# Patient Record
Sex: FEMALE | Race: WHITE | Employment: FULL TIME | URBAN - METROPOLITAN AREA
[De-identification: names, ages, dates, MRNs, and addresses within clinical notes are randomized per-mention and may not be internally consistent; named-entity substitution may affect disease eponyms.]

---

## 2017-01-31 ENCOUNTER — OFFICE VISIT (OUTPATIENT)
Dept: INFECTIOUS DISEASES | Age: 47
End: 2017-01-31

## 2017-01-31 VITALS
HEART RATE: 69 BPM | TEMPERATURE: 97.6 F | BODY MASS INDEX: 31.34 KG/M2 | DIASTOLIC BLOOD PRESSURE: 73 MMHG | SYSTOLIC BLOOD PRESSURE: 112 MMHG | HEIGHT: 66 IN | WEIGHT: 195 LBS

## 2017-01-31 DIAGNOSIS — L03.114 CELLULITIS OF LEFT ARM: ICD-10-CM

## 2017-01-31 PROCEDURE — 99214 OFFICE O/P EST MOD 30 MIN: CPT | Performed by: INTERNAL MEDICINE

## 2017-01-31 RX ORDER — DOXYCYCLINE HYCLATE 100 MG
100 TABLET ORAL DAILY
Qty: 90 TABLET | Refills: 3 | Status: SHIPPED | OUTPATIENT
Start: 2017-01-31 | End: 2017-08-14

## 2017-02-06 ENCOUNTER — HOSPITAL ENCOUNTER (OUTPATIENT)
Dept: CT IMAGING | Age: 47
Discharge: HOME | End: 2017-02-06
Attending: INTERNAL MEDICINE | Admitting: INTERNAL MEDICINE

## 2017-02-06 DIAGNOSIS — L03.114 CELLULITIS OF LEFT UPPER EXTREMITY: ICD-10-CM

## 2017-02-06 DIAGNOSIS — L03.114 CELLULITIS OF LEFT ARM: ICD-10-CM

## 2017-02-07 ENCOUNTER — TELEPHONE (OUTPATIENT)
Dept: INFECTIOUS DISEASES | Age: 47
End: 2017-02-07

## 2017-03-01 ENCOUNTER — TELEPHONE (OUTPATIENT)
Dept: INFECTIOUS DISEASES | Age: 47
End: 2017-03-01

## 2017-03-01 ENCOUNTER — HOSPITAL ENCOUNTER (OUTPATIENT)
Dept: ONCOLOGY | Age: 47
Discharge: HOME | End: 2017-03-31
Attending: INTERNAL MEDICINE | Admitting: INTERNAL MEDICINE

## 2017-04-01 ENCOUNTER — HOSPITAL ENCOUNTER (OUTPATIENT)
Dept: ONCOLOGY | Age: 47
Discharge: HOME | End: 2017-04-30
Attending: INTERNAL MEDICINE | Admitting: INTERNAL MEDICINE

## 2017-04-12 ENCOUNTER — HOSPITAL ENCOUNTER (OUTPATIENT)
Dept: MRI IMAGING | Age: 47
Discharge: HOME | End: 2017-04-12
Attending: INTERNAL MEDICINE | Admitting: INTERNAL MEDICINE

## 2017-04-12 DIAGNOSIS — Z51.12 ENCOUNTER FOR ANTINEOPLASTIC IMMUNOTHERAPY: ICD-10-CM

## 2017-04-12 DIAGNOSIS — C50.412 CARCINOMA OF UPPER-OUTER QUADRANT OF LEFT FEMALE BREAST (HCC): ICD-10-CM

## 2017-04-12 DIAGNOSIS — D33.3 LEFT ACOUSTIC NEUROMA (HCC): ICD-10-CM

## 2017-04-12 DIAGNOSIS — C50.412 MALIGNANT NEOPLASM OF UPPER-OUTER QUADRANT OF LEFT FEMALE BREAST (HCC): ICD-10-CM

## 2017-04-12 DIAGNOSIS — I89.0 LYMPHEDEMA OF ARM: ICD-10-CM

## 2017-04-12 RX ORDER — SODIUM CHLORIDE 0.9 % (FLUSH) 0.9 %
10 SYRINGE (ML) INJECTION ONCE
Status: COMPLETED | OUTPATIENT
Start: 2017-04-12 | End: 2017-04-12

## 2017-04-12 RX ADMIN — Medication 10 ML: at 17:07

## 2017-05-24 ENCOUNTER — HOSPITAL ENCOUNTER (OUTPATIENT)
Dept: NON INVASIVE DIAGNOSTICS | Age: 47
Discharge: HOME | End: 2017-05-24
Attending: INTERNAL MEDICINE | Admitting: INTERNAL MEDICINE

## 2017-05-24 DIAGNOSIS — C50.412 MALIGNANT NEOPLASM OF UPPER-OUTER QUADRANT OF LEFT FEMALE BREAST (HCC): ICD-10-CM

## 2017-05-24 LAB
LV EF: 60 %
LVEF MODALITY: NORMAL

## 2017-05-25 LAB
LEFT VENTRICULAR EJECTION FRACTION HIGH VALUE: 60 %
LEFT VENTRICULAR EJECTION FRACTION MODE: NORMAL

## 2017-09-11 ENCOUNTER — HOSPITAL ENCOUNTER (OUTPATIENT)
Dept: MRI IMAGING | Age: 47
Discharge: HOME | End: 2017-09-11
Attending: SURGERY | Admitting: SURGERY

## 2017-09-11 DIAGNOSIS — C50.412 MALIGNANT NEOPLASM OF UPPER-OUTER QUADRANT OF LEFT FEMALE BREAST (HCC): ICD-10-CM

## 2017-09-11 DIAGNOSIS — N60.81 FLAT EPITHELIAL ATYPIA OF BREAST, RIGHT: ICD-10-CM

## 2017-09-11 DIAGNOSIS — Z85.3 PERSONAL HISTORY OF BREAST CANCER: ICD-10-CM

## 2017-09-11 DIAGNOSIS — Z85.3 PERSONAL HISTORY OF MALIGNANT NEOPLASM OF BREAST: ICD-10-CM

## 2017-09-11 RX ORDER — SODIUM CHLORIDE 9 MG/ML
INJECTION, SOLUTION INTRAVENOUS ONCE
Status: COMPLETED | OUTPATIENT
Start: 2017-09-11 | End: 2017-09-11

## 2017-09-11 RX ADMIN — SODIUM CHLORIDE: 9 INJECTION, SOLUTION INTRAVENOUS at 09:25

## 2017-09-20 ENCOUNTER — HOSPITAL ENCOUNTER (OUTPATIENT)
Dept: MAMMOGRAPHY | Age: 47
Discharge: HOME | End: 2017-09-20
Attending: SURGERY | Admitting: SURGERY

## 2017-09-20 ENCOUNTER — OFFICE VISIT (OUTPATIENT)
Dept: SURGERY | Age: 47
End: 2017-09-20

## 2017-09-20 VITALS
HEIGHT: 66 IN | SYSTOLIC BLOOD PRESSURE: 116 MMHG | BODY MASS INDEX: 31.34 KG/M2 | WEIGHT: 195 LBS | HEART RATE: 70 BPM | DIASTOLIC BLOOD PRESSURE: 81 MMHG

## 2017-09-20 DIAGNOSIS — Z85.3 ENCOUNTER FOR FOLLOW-UP SURVEILLANCE OF BREAST CANCER: ICD-10-CM

## 2017-09-20 DIAGNOSIS — Z08 ENCOUNTER FOR FOLLOW-UP SURVEILLANCE OF BREAST CANCER: ICD-10-CM

## 2017-09-20 DIAGNOSIS — Z85.3 PERSONAL HISTORY OF BREAST CANCER: Primary | ICD-10-CM

## 2017-09-20 DIAGNOSIS — Z92.3 HISTORY OF RADIATION THERAPY: ICD-10-CM

## 2017-09-20 DIAGNOSIS — Z90.13 HISTORY OF PARTIAL MASTECTOMY, BILATERAL: ICD-10-CM

## 2017-09-20 DIAGNOSIS — I89.0 LYMPHEDEMA OF ARM: ICD-10-CM

## 2017-09-20 DIAGNOSIS — N60.89 FLAT EPITHELIAL ATYPIA (FEA) OF BREAST: ICD-10-CM

## 2017-09-20 DIAGNOSIS — L04.2 ACUTE LYMPHADENITIS OF UPPER LIMB: ICD-10-CM

## 2017-09-20 PROCEDURE — 99213 OFFICE O/P EST LOW 20 MIN: CPT | Performed by: SURGERY

## 2017-10-04 LAB
HPV COMMENT: NORMAL
HPV TYPE 16: NOT DETECTED
HPV TYPE 18: NOT DETECTED
HPVOH (OTHER TYPES): NOT DETECTED

## 2017-12-01 ENCOUNTER — HOSPITAL ENCOUNTER (OUTPATIENT)
Dept: NON INVASIVE DIAGNOSTICS | Age: 47
Discharge: HOME | End: 2017-12-01
Admitting: INTERNAL MEDICINE

## 2017-12-01 DIAGNOSIS — C50.412 MALIGNANT NEOPLASM OF UPPER-OUTER QUADRANT OF LEFT FEMALE BREAST (HCC): ICD-10-CM

## 2017-12-01 LAB
LV EF: 60 %
LVEF MODALITY: NORMAL

## 2018-12-03 ENCOUNTER — HOSPITAL ENCOUNTER (EMERGENCY)
Age: 48
Discharge: HOME | End: 2018-12-03
Attending: EMERGENCY MEDICINE
Payer: COMMERCIAL

## 2018-12-03 VITALS
RESPIRATION RATE: 18 BRPM | HEART RATE: 77 BPM | WEIGHT: 190 LBS | BODY MASS INDEX: 30.53 KG/M2 | HEIGHT: 66 IN | TEMPERATURE: 99 F | OXYGEN SATURATION: 97 % | DIASTOLIC BLOOD PRESSURE: 76 MMHG | SYSTOLIC BLOOD PRESSURE: 115 MMHG

## 2018-12-03 DIAGNOSIS — L03.114 LEFT ARM CELLULITIS: Primary | ICD-10-CM

## 2018-12-03 LAB
A/G RATIO: 1.3 (ref 1.1–2.2)
ALBUMIN SERPL-MCNC: 4.4 G/DL (ref 3.4–5)
ALP BLD-CCNC: 95 U/L (ref 40–129)
ALT SERPL-CCNC: 20 U/L (ref 10–40)
ANION GAP SERPL CALCULATED.3IONS-SCNC: 12 MMOL/L (ref 3–16)
AST SERPL-CCNC: 22 U/L (ref 15–37)
BASOPHILS ABSOLUTE: 0.1 K/UL (ref 0–0.2)
BASOPHILS RELATIVE PERCENT: 0.3 %
BILIRUB SERPL-MCNC: 0.7 MG/DL (ref 0–1)
BUN BLDV-MCNC: 14 MG/DL (ref 7–20)
CALCIUM SERPL-MCNC: 9.9 MG/DL (ref 8.3–10.6)
CHLORIDE BLD-SCNC: 99 MMOL/L (ref 99–110)
CO2: 26 MMOL/L (ref 21–32)
CREAT SERPL-MCNC: 0.9 MG/DL (ref 0.6–1.1)
EOSINOPHILS ABSOLUTE: 0 K/UL (ref 0–0.6)
EOSINOPHILS RELATIVE PERCENT: 0.1 %
GFR AFRICAN AMERICAN: >60
GFR NON-AFRICAN AMERICAN: >60
GLOBULIN: 3.5 G/DL
GLUCOSE BLD-MCNC: 119 MG/DL (ref 70–99)
HCT VFR BLD CALC: 39.1 % (ref 36–48)
HEMOGLOBIN: 13.2 G/DL (ref 12–16)
LACTIC ACID: 0.8 MMOL/L (ref 0.4–2)
LYMPHOCYTES ABSOLUTE: 1.8 K/UL (ref 1–5.1)
LYMPHOCYTES RELATIVE PERCENT: 11.7 %
MCH RBC QN AUTO: 30.9 PG (ref 26–34)
MCHC RBC AUTO-ENTMCNC: 33.8 G/DL (ref 31–36)
MCV RBC AUTO: 91.3 FL (ref 80–100)
MONOCYTES ABSOLUTE: 1 K/UL (ref 0–1.3)
MONOCYTES RELATIVE PERCENT: 6.5 %
NEUTROPHILS ABSOLUTE: 12.7 K/UL (ref 1.7–7.7)
NEUTROPHILS RELATIVE PERCENT: 81.4 %
PDW BLD-RTO: 12.1 % (ref 12.4–15.4)
PLATELET # BLD: 321 K/UL (ref 135–450)
PMV BLD AUTO: 7.5 FL (ref 5–10.5)
POTASSIUM REFLEX MAGNESIUM: 4.2 MMOL/L (ref 3.5–5.1)
RBC # BLD: 4.29 M/UL (ref 4–5.2)
SODIUM BLD-SCNC: 137 MMOL/L (ref 136–145)
TOTAL PROTEIN: 7.9 G/DL (ref 6.4–8.2)
WBC # BLD: 15.6 K/UL (ref 4–11)

## 2018-12-03 PROCEDURE — 85025 COMPLETE CBC W/AUTO DIFF WBC: CPT

## 2018-12-03 PROCEDURE — 93971 EXTREMITY STUDY: CPT

## 2018-12-03 PROCEDURE — 80053 COMPREHEN METABOLIC PANEL: CPT

## 2018-12-03 PROCEDURE — 6370000000 HC RX 637 (ALT 250 FOR IP): Performed by: EMERGENCY MEDICINE

## 2018-12-03 PROCEDURE — 83605 ASSAY OF LACTIC ACID: CPT

## 2018-12-03 PROCEDURE — 99284 EMERGENCY DEPT VISIT MOD MDM: CPT

## 2018-12-03 PROCEDURE — 87040 BLOOD CULTURE FOR BACTERIA: CPT

## 2018-12-03 RX ORDER — DOXYCYCLINE HYCLATE 100 MG
100 TABLET ORAL 2 TIMES DAILY
Qty: 20 TABLET | Refills: 0 | Status: SHIPPED | OUTPATIENT
Start: 2018-12-03 | End: 2018-12-13

## 2018-12-03 RX ORDER — CLINDAMYCIN HYDROCHLORIDE 150 MG/1
150 CAPSULE ORAL 3 TIMES DAILY
COMMUNITY
End: 2022-03-04

## 2018-12-03 RX ORDER — ACETAMINOPHEN 500 MG
1000 TABLET ORAL ONCE
Status: COMPLETED | OUTPATIENT
Start: 2018-12-03 | End: 2018-12-03

## 2018-12-03 RX ADMIN — ACETAMINOPHEN 1000 MG: 500 TABLET, FILM COATED ORAL at 09:07

## 2018-12-03 ASSESSMENT — PAIN SCALES - GENERAL: PAINLEVEL_OUTOF10: 5

## 2018-12-03 NOTE — ED PROVIDER NOTES
PROVIDER IN [facility]  eMERGENCY dEPARTMENT eNCOUnter      Pt Name: Abraham Villarreal  MRN: [id number]  Date of evaluation: 12/3/2018      Chief Complaint:    Chief Complaint   Patient presents with    Cellulitis     PT C/O RIGHT ARM SWELLING AND INFECTION. PT HAS HISTORY OF  CELLULITIS AND TAKES CLINDAMYCIN TO TREAT. PT REPORTS THAT THE SWELLING BEGAN LAST YESTERDAY AFTERNOON AND HAS TAKEN PRESCRIBED MEDICATION BUT SWELLING HAS INCREASED. HPI:  This is a  51 y.o. female who presents to the emergency department Ensued to the emergency department, the patient states that she has a history of lymphedema and gets chronic cellulitis. She stick and 3 doses of clindamycin, she states that she actually feels a lot better, she is not febrile, she does not have flulike symptoms, she states the swelling is distal little bit worse today but not bad, she was at cancer care and was told that she needed to come to the emergency department for evaluation. She denies any chest pain or shortness of breath. She does note that there is a little redness on her breast.      Physical Exam: (Pertinent Negatives and Positives)  ED Triage Vitals [12/03/18 0826]   BP Temp Temp Source Pulse Resp SpO2 Height Weight   115/76 99 °F (37.2 °C) Infrared -- 10 97 % 5' 6\" (1.676 m) 190 lb (86.2 kg)     Well-nourished well-developed nontoxic bearing female, large amount of edema noted to the left arm, some redness to the left hand, left bicep, and slightly to the left breast.  Heart regular rate and rhythm without murmurs clicks or gallops abdomen soft nontender nondistended.     PLAN:  LABS:   Labs Reviewed   CULTURE BLOOD #1   CULTURE BLOOD #2   CBC WITH AUTO DIFFERENTIAL   COMPREHENSIVE METABOLIC PANEL W/ REFLEX TO MG FOR LOW K   LACTIC ACID, PLASMA     Radiology Studies:   No orders to display       We will do a straight stick, and if the laboratory workup is essentially normal or
Occupational History    Not on file. Social History Main Topics    Smoking status: Former Smoker     Packs/day: 1.00     Years: 10.00     Quit date: 9/28/2004    Smokeless tobacco: Never Used    Alcohol use No    Drug use: No    Sexual activity: Yes     Partners: Male     Other Topics Concern    Not on file     Social History Narrative    No narrative on file     No current facility-administered medications for this encounter. Current Outpatient Prescriptions   Medication Sig Dispense Refill    clindamycin (CLEOCIN) 150 MG capsule Take 150 mg by mouth 3 times daily      doxycycline hyclate (VIBRA-TABS) 100 MG tablet Take 1 tablet by mouth 2 times daily for 10 days 20 tablet 0    Omeprazole (PRILOSEC PO) Take by mouth      VENTOLIN  (90 BASE) MCG/ACT inhaler Inhale 108 mcg into the lungs as needed      LORazepam (ATIVAN) 1 MG tablet Take 1 mg by mouth every 6 hours as needed       lactobacillus (CULTURELLE) capsule Take 1 capsule by mouth daily 30 capsule 2     Allergies   Allergen Reactions    Dalvance [Dalbavancin] Itching and Other (See Comments)     Sneezing, itchy throat and coughing    Augmentin [Amoxicillin-Pot Clavulanate] Hives     hives    Cefazolin     Adhesive Tape Rash       REVIEW OF SYSTEMS  6 systems reviewed, pertinent positives per HPI otherwise noted to be negative    PHYSICAL EXAM   /76   Pulse 77   Temp 99 °F (37.2 °C) (Infrared)   Resp 18   Ht 5' 6\" (1.676 m)   Wt 190 lb (86.2 kg)   SpO2 97%   BMI 30.67 kg/m²   GENERAL APPEARANCE: Awake and alert. Cooperative. No acute distress. HEAD: Normocephalic. Atraumatic. EYES: PERRL. EOM's grossly intact. ENT: Mucous membranes are moist.   NECK: Supple. Normal ROM. CHEST: Equal symmetric chest rise. RRR  LUNGS: Breathing is unlabored. Speaking comfortably in full sentences. CTAB  ABDOMEN: Nondistended, nontender  MUSCULOSKELETAL:  RUE: No tenderness. 2+ radial pulse. Brisk cap refill x5 digits.

## 2018-12-03 NOTE — LETTER
[facility] Emergency Department  [address] [facility], [address]             December 3, 2018    Patient: Ashanti Day   YOB: 1970   Date of Visit: 12/3/2018       To Whom It May Concern:    Kulwinder Berry was seen and treated in our emergency department on 12/3/2018. She may return to work on 12/4/18. Sincerely,         CHHAYA Duarte RN

## 2018-12-03 NOTE — ED NOTES
Patient in with complaints of hx of cellulitis. Left arm appears to red and swollen. Pulses 2+ with rate of 98. Denies needs at this time. Will continue to monitor.       Adalberto Dumont RN  12/03/18 1451

## 2018-12-08 LAB — BLOOD CULTURE, ROUTINE: NORMAL

## 2020-09-09 ENCOUNTER — OFFICE VISIT (OUTPATIENT)
Dept: PRIMARY CARE CLINIC | Age: 50
End: 2020-09-09
Payer: COMMERCIAL

## 2020-09-09 PROCEDURE — 99211 OFF/OP EST MAY X REQ PHY/QHP: CPT | Performed by: NURSE PRACTITIONER

## 2020-09-09 NOTE — PATIENT INSTRUCTIONS

## 2020-09-12 LAB — SARS-COV-2, NAA: ABNORMAL

## 2022-02-28 ENCOUNTER — HOSPITAL ENCOUNTER (OUTPATIENT)
Dept: NON INVASIVE DIAGNOSTICS | Age: 52
Discharge: HOME | End: 2022-02-28
Payer: COMMERCIAL

## 2022-02-28 DIAGNOSIS — Z51.11 ENCOUNTER FOR ANTINEOPLASTIC CHEMOTHERAPY: ICD-10-CM

## 2022-02-28 LAB
LV EF: 53 %
LVEF MODALITY: NORMAL

## 2022-02-28 PROCEDURE — 93306 TTE W/DOPPLER COMPLETE: CPT

## 2022-02-28 PROCEDURE — 93356 MYOCRD STRAIN IMG SPCKL TRCK: CPT

## 2022-03-04 ENCOUNTER — HOSPITAL ENCOUNTER (INPATIENT)
Age: 52
LOS: 2 days | Discharge: HOME | DRG: 603 | End: 2022-03-06
Attending: EMERGENCY MEDICINE | Admitting: INTERNAL MEDICINE
Payer: COMMERCIAL

## 2022-03-04 DIAGNOSIS — L03.114 LEFT ARM CELLULITIS: Primary | ICD-10-CM

## 2022-03-04 DIAGNOSIS — Z78.9 FAILURE OF OUTPATIENT TREATMENT: ICD-10-CM

## 2022-03-04 LAB
A/G RATIO: 1.7 (ref 1.1–2.2)
ALBUMIN SERPL-MCNC: 4.4 G/DL (ref 3.4–5)
ALP BLD-CCNC: 111 U/L (ref 40–129)
ALT SERPL-CCNC: 23 U/L (ref 10–40)
ANION GAP SERPL CALCULATED.3IONS-SCNC: 13 MMOL/L (ref 3–16)
AST SERPL-CCNC: 29 U/L (ref 15–37)
BASOPHILS ABSOLUTE: 0 K/UL (ref 0–0.2)
BASOPHILS RELATIVE PERCENT: 0.3 %
BILIRUB SERPL-MCNC: 0.6 MG/DL (ref 0–1)
BUN BLDV-MCNC: 11 MG/DL (ref 7–20)
CALCIUM SERPL-MCNC: 9.5 MG/DL (ref 8.3–10.6)
CHLORIDE BLD-SCNC: 103 MMOL/L (ref 99–110)
CO2: 23 MMOL/L (ref 21–32)
CREAT SERPL-MCNC: 0.9 MG/DL (ref 0.6–1.1)
EOSINOPHILS ABSOLUTE: 0.1 K/UL (ref 0–0.6)
EOSINOPHILS RELATIVE PERCENT: 1.3 %
GFR AFRICAN AMERICAN: >60
GFR NON-AFRICAN AMERICAN: >60
GLUCOSE BLD-MCNC: 143 MG/DL (ref 70–99)
HCT VFR BLD CALC: 33.5 % (ref 36–48)
HEMOGLOBIN: 11.5 G/DL (ref 12–16)
LACTIC ACID, SEPSIS: 1.4 MMOL/L (ref 0.4–1.9)
LYMPHOCYTES ABSOLUTE: 1.1 K/UL (ref 1–5.1)
LYMPHOCYTES RELATIVE PERCENT: 14.5 %
MCH RBC QN AUTO: 31.3 PG (ref 26–34)
MCHC RBC AUTO-ENTMCNC: 34.4 G/DL (ref 31–36)
MCV RBC AUTO: 91.2 FL (ref 80–100)
MONOCYTES ABSOLUTE: 0.6 K/UL (ref 0–1.3)
MONOCYTES RELATIVE PERCENT: 7.6 %
NEUTROPHILS ABSOLUTE: 5.7 K/UL (ref 1.7–7.7)
NEUTROPHILS RELATIVE PERCENT: 76.3 %
PDW BLD-RTO: 12.9 % (ref 12.4–15.4)
PLATELET # BLD: 241 K/UL (ref 135–450)
PMV BLD AUTO: 7.2 FL (ref 5–10.5)
POTASSIUM SERPL-SCNC: 3.5 MMOL/L (ref 3.5–5.1)
RBC # BLD: 3.67 M/UL (ref 4–5.2)
SODIUM BLD-SCNC: 139 MMOL/L (ref 136–145)
TOTAL PROTEIN: 7 G/DL (ref 6.4–8.2)
WBC # BLD: 7.5 K/UL (ref 4–11)

## 2022-03-04 PROCEDURE — 83605 ASSAY OF LACTIC ACID: CPT

## 2022-03-04 PROCEDURE — 36415 COLL VENOUS BLD VENIPUNCTURE: CPT

## 2022-03-04 PROCEDURE — 80053 COMPREHEN METABOLIC PANEL: CPT

## 2022-03-04 PROCEDURE — 99283 EMERGENCY DEPT VISIT LOW MDM: CPT

## 2022-03-04 PROCEDURE — 1200000000 HC SEMI PRIVATE

## 2022-03-04 PROCEDURE — 6360000002 HC RX W HCPCS: Performed by: EMERGENCY MEDICINE

## 2022-03-04 PROCEDURE — 87040 BLOOD CULTURE FOR BACTERIA: CPT

## 2022-03-04 PROCEDURE — 85025 COMPLETE CBC W/AUTO DIFF WBC: CPT

## 2022-03-04 PROCEDURE — 2500000003 HC RX 250 WO HCPCS: Performed by: PHYSICIAN ASSISTANT

## 2022-03-04 PROCEDURE — 2580000003 HC RX 258: Performed by: EMERGENCY MEDICINE

## 2022-03-04 PROCEDURE — 6370000000 HC RX 637 (ALT 250 FOR IP): Performed by: INTERNAL MEDICINE

## 2022-03-04 PROCEDURE — 2580000003 HC RX 258: Performed by: PHYSICIAN ASSISTANT

## 2022-03-04 RX ORDER — POTASSIUM CHLORIDE 20 MEQ/1
40 TABLET, EXTENDED RELEASE ORAL ONCE
Status: COMPLETED | OUTPATIENT
Start: 2022-03-04 | End: 2022-03-04

## 2022-03-04 RX ADMIN — DOXYCYCLINE 100 MG: 100 INJECTION, POWDER, LYOPHILIZED, FOR SOLUTION INTRAVENOUS at 23:08

## 2022-03-04 RX ADMIN — POTASSIUM CHLORIDE 40 MEQ: 20 TABLET, EXTENDED RELEASE ORAL at 21:56

## 2022-03-04 RX ADMIN — VANCOMYCIN HYDROCHLORIDE 1250 MG: 10 INJECTION, POWDER, LYOPHILIZED, FOR SOLUTION INTRAVENOUS at 21:18

## 2022-03-04 ASSESSMENT — ENCOUNTER SYMPTOMS
ABDOMINAL PAIN: 0
COUGH: 0
NAUSEA: 0
COLOR CHANGE: 1
SHORTNESS OF BREATH: 0
DIARRHEA: 0
RHINORRHEA: 0
VOMITING: 0

## 2022-03-04 ASSESSMENT — PAIN SCALES - GENERAL: PAINLEVEL_OUTOF10: 4

## 2022-03-04 ASSESSMENT — PAIN - FUNCTIONAL ASSESSMENT: PAIN_FUNCTIONAL_ASSESSMENT: 0-10

## 2022-03-05 LAB
A/G RATIO: 1.6 (ref 1.1–2.2)
ALBUMIN SERPL-MCNC: 4 G/DL (ref 3.4–5)
ALP BLD-CCNC: 104 U/L (ref 40–129)
ALT SERPL-CCNC: 21 U/L (ref 10–40)
ANION GAP SERPL CALCULATED.3IONS-SCNC: 10 MMOL/L (ref 3–16)
AST SERPL-CCNC: 27 U/L (ref 15–37)
BASOPHILS ABSOLUTE: 0 K/UL (ref 0–0.2)
BASOPHILS RELATIVE PERCENT: 0.6 %
BILIRUB SERPL-MCNC: 0.4 MG/DL (ref 0–1)
BUN BLDV-MCNC: 12 MG/DL (ref 7–20)
CALCIUM SERPL-MCNC: 9.4 MG/DL (ref 8.3–10.6)
CHLORIDE BLD-SCNC: 108 MMOL/L (ref 99–110)
CO2: 21 MMOL/L (ref 21–32)
CREAT SERPL-MCNC: 0.8 MG/DL (ref 0.6–1.1)
EOSINOPHILS ABSOLUTE: 0.1 K/UL (ref 0–0.6)
EOSINOPHILS RELATIVE PERCENT: 2.4 %
GFR AFRICAN AMERICAN: >60
GFR NON-AFRICAN AMERICAN: >60
GLUCOSE BLD-MCNC: 111 MG/DL (ref 70–99)
HCT VFR BLD CALC: 32.2 % (ref 36–48)
HEMOGLOBIN: 11.2 G/DL (ref 12–16)
LYMPHOCYTES ABSOLUTE: 1.2 K/UL (ref 1–5.1)
LYMPHOCYTES RELATIVE PERCENT: 21.1 %
MAGNESIUM: 2 MG/DL (ref 1.8–2.4)
MCH RBC QN AUTO: 31.6 PG (ref 26–34)
MCHC RBC AUTO-ENTMCNC: 34.7 G/DL (ref 31–36)
MCV RBC AUTO: 91.1 FL (ref 80–100)
MONOCYTES ABSOLUTE: 0.6 K/UL (ref 0–1.3)
MONOCYTES RELATIVE PERCENT: 10.8 %
NEUTROPHILS ABSOLUTE: 3.7 K/UL (ref 1.7–7.7)
NEUTROPHILS RELATIVE PERCENT: 65.1 %
PDW BLD-RTO: 12.9 % (ref 12.4–15.4)
PHOSPHORUS: 3 MG/DL (ref 2.5–4.9)
PLATELET # BLD: 227 K/UL (ref 135–450)
PMV BLD AUTO: 7.5 FL (ref 5–10.5)
POTASSIUM SERPL-SCNC: 4.4 MMOL/L (ref 3.5–5.1)
RBC # BLD: 3.54 M/UL (ref 4–5.2)
SODIUM BLD-SCNC: 139 MMOL/L (ref 136–145)
TOTAL PROTEIN: 6.5 G/DL (ref 6.4–8.2)
VANCOMYCIN RANDOM: 10 UG/ML
WBC # BLD: 5.7 K/UL (ref 4–11)

## 2022-03-05 PROCEDURE — 6370000000 HC RX 637 (ALT 250 FOR IP): Performed by: NURSE PRACTITIONER

## 2022-03-05 PROCEDURE — 6370000000 HC RX 637 (ALT 250 FOR IP): Performed by: INTERNAL MEDICINE

## 2022-03-05 PROCEDURE — 85025 COMPLETE CBC W/AUTO DIFF WBC: CPT

## 2022-03-05 PROCEDURE — 6360000002 HC RX W HCPCS: Performed by: INTERNAL MEDICINE

## 2022-03-05 PROCEDURE — 99223 1ST HOSP IP/OBS HIGH 75: CPT | Performed by: INTERNAL MEDICINE

## 2022-03-05 PROCEDURE — 84100 ASSAY OF PHOSPHORUS: CPT

## 2022-03-05 PROCEDURE — 93971 EXTREMITY STUDY: CPT

## 2022-03-05 PROCEDURE — 80053 COMPREHEN METABOLIC PANEL: CPT

## 2022-03-05 PROCEDURE — 2580000003 HC RX 258: Performed by: INTERNAL MEDICINE

## 2022-03-05 PROCEDURE — 87081 CULTURE SCREEN ONLY: CPT

## 2022-03-05 PROCEDURE — 1200000000 HC SEMI PRIVATE

## 2022-03-05 PROCEDURE — 80202 ASSAY OF VANCOMYCIN: CPT

## 2022-03-05 PROCEDURE — 94760 N-INVAS EAR/PLS OXIMETRY 1: CPT

## 2022-03-05 PROCEDURE — 83735 ASSAY OF MAGNESIUM: CPT

## 2022-03-05 PROCEDURE — 36415 COLL VENOUS BLD VENIPUNCTURE: CPT

## 2022-03-05 RX ORDER — SODIUM CHLORIDE 9 MG/ML
25 INJECTION, SOLUTION INTRAVENOUS PRN
Status: DISCONTINUED | OUTPATIENT
Start: 2022-03-05 | End: 2022-03-06 | Stop reason: HOSPADM

## 2022-03-05 RX ORDER — SODIUM CHLORIDE 0.9 % (FLUSH) 0.9 %
10 SYRINGE (ML) INJECTION PRN
Status: DISCONTINUED | OUTPATIENT
Start: 2022-03-05 | End: 2022-03-06 | Stop reason: HOSPADM

## 2022-03-05 RX ORDER — PANTOPRAZOLE SODIUM 40 MG/1
40 TABLET, DELAYED RELEASE ORAL
Status: DISCONTINUED | OUTPATIENT
Start: 2022-03-05 | End: 2022-03-06 | Stop reason: HOSPADM

## 2022-03-05 RX ORDER — POLYETHYLENE GLYCOL 3350 17 G/17G
17 POWDER, FOR SOLUTION ORAL DAILY PRN
Status: DISCONTINUED | OUTPATIENT
Start: 2022-03-05 | End: 2022-03-06 | Stop reason: HOSPADM

## 2022-03-05 RX ORDER — LACTOBACILLUS RHAMNOSUS GG 10B CELL
1 CAPSULE ORAL 2 TIMES DAILY WITH MEALS
Status: DISCONTINUED | OUTPATIENT
Start: 2022-03-05 | End: 2022-03-06 | Stop reason: HOSPADM

## 2022-03-05 RX ORDER — SODIUM CHLORIDE 0.9 % (FLUSH) 0.9 %
5-40 SYRINGE (ML) INJECTION EVERY 12 HOURS SCHEDULED
Status: DISCONTINUED | OUTPATIENT
Start: 2022-03-05 | End: 2022-03-06 | Stop reason: HOSPADM

## 2022-03-05 RX ORDER — ALBUTEROL SULFATE 90 UG/1
2 AEROSOL, METERED RESPIRATORY (INHALATION) EVERY 6 HOURS PRN
Status: DISCONTINUED | OUTPATIENT
Start: 2022-03-05 | End: 2022-03-06 | Stop reason: HOSPADM

## 2022-03-05 RX ORDER — ACETAMINOPHEN 650 MG/1
650 SUPPOSITORY RECTAL EVERY 6 HOURS PRN
Status: DISCONTINUED | OUTPATIENT
Start: 2022-03-05 | End: 2022-03-06 | Stop reason: HOSPADM

## 2022-03-05 RX ORDER — LACTOBACILLUS RHAMNOSUS GG 10B CELL
1 CAPSULE ORAL DAILY
Status: DISCONTINUED | OUTPATIENT
Start: 2022-03-05 | End: 2022-03-05

## 2022-03-05 RX ORDER — ACETAMINOPHEN 325 MG/1
650 TABLET ORAL EVERY 6 HOURS PRN
Status: DISCONTINUED | OUTPATIENT
Start: 2022-03-05 | End: 2022-03-06 | Stop reason: HOSPADM

## 2022-03-05 RX ORDER — ONDANSETRON 4 MG/1
4 TABLET, ORALLY DISINTEGRATING ORAL EVERY 8 HOURS PRN
Status: DISCONTINUED | OUTPATIENT
Start: 2022-03-05 | End: 2022-03-06 | Stop reason: HOSPADM

## 2022-03-05 RX ORDER — ONDANSETRON 2 MG/ML
4 INJECTION INTRAMUSCULAR; INTRAVENOUS EVERY 6 HOURS PRN
Status: DISCONTINUED | OUTPATIENT
Start: 2022-03-05 | End: 2022-03-06 | Stop reason: HOSPADM

## 2022-03-05 RX ORDER — OXYCODONE HYDROCHLORIDE 5 MG/1
5 TABLET ORAL EVERY 4 HOURS PRN
Status: DISCONTINUED | OUTPATIENT
Start: 2022-03-05 | End: 2022-03-06 | Stop reason: HOSPADM

## 2022-03-05 RX ORDER — OMEPRAZOLE 10 MG/1
20 CAPSULE, DELAYED RELEASE ORAL EVERY MORNING
Status: DISCONTINUED | OUTPATIENT
Start: 2022-03-05 | End: 2022-03-05 | Stop reason: CLARIF

## 2022-03-05 RX ADMIN — SODIUM CHLORIDE, PRESERVATIVE FREE 10 ML: 5 INJECTION INTRAVENOUS at 20:21

## 2022-03-05 RX ADMIN — CEFAZOLIN 2000 MG: 10 INJECTION, POWDER, FOR SOLUTION INTRAVENOUS at 22:29

## 2022-03-05 RX ADMIN — Medication 1 CAPSULE: at 16:04

## 2022-03-05 RX ADMIN — ENOXAPARIN SODIUM 40 MG: 40 INJECTION SUBCUTANEOUS at 08:33

## 2022-03-05 RX ADMIN — VANCOMYCIN HYDROCHLORIDE 1000 MG: 1 INJECTION, POWDER, LYOPHILIZED, FOR SOLUTION INTRAVENOUS at 08:33

## 2022-03-05 RX ADMIN — SODIUM CHLORIDE, PRESERVATIVE FREE 10 ML: 5 INJECTION INTRAVENOUS at 08:33

## 2022-03-05 RX ADMIN — CEFAZOLIN 2000 MG: 10 INJECTION, POWDER, FOR SOLUTION INTRAVENOUS at 16:04

## 2022-03-05 RX ADMIN — PANTOPRAZOLE SODIUM 40 MG: 40 TABLET, DELAYED RELEASE ORAL at 05:42

## 2022-03-05 ASSESSMENT — PAIN SCALES - GENERAL
PAINLEVEL_OUTOF10: 0

## 2022-03-05 NOTE — CONSULTS
Infectious Diseases Inpatient Consult Note    Reason for Consult:   L arm cellulitis   Requesting Physician:   Dr Triston Boone   Primary Care Physician:  Arpit Olivares MD  History Obtained From:   Pt, EPIC    Admit Date: 3/4/2022  Hospital Day: 2    CHIEF COMPLAINT:       Chief Complaint   Patient presents with    Cellulitis     Pt reports cellulitus to L arm. states she has been on oral abx since Feb. 10 with no improvement. Pt reports lymphedema from breast cancer.         HISTORY OF PRESENT ILLNESS:      44 yo woman with hx obesity (BMI 32),JOSEPH, HL  Hx L breast ca  Hx L UE lymphedema   Hx recurrent L UE cellulitis  Augmentin - rash     Presents with L UE redness, swelling and pain  Assoc with chills    In ED 3/4, afeb, WBC 7.5,   Admit, started on vancomycin + doxycycline     Today 3/5, afeb, WBC 5.7  Pt reports L arm is 'a little better'      Past Medical History:    Past Medical History:   Diagnosis Date    Allergic     Anesthesia     blood pressure dropped with uterine surgery    Asthma     Cancer ([facility] [address] 2008, 2012    BREAST  chemo and radiation    Cellulitis 09/28/2016    left arm    GERD (gastroesophageal reflux disease)     Hyperlipidemia        Past Surgical History:    Past Surgical History:   Procedure Laterality Date    BACK SURGERY      disc L4-L5 shaved    BREAST SURGERY      LUMP    CERVICAL POLYP REMOVAL      MASTECTOMY, PARTIAL Right 5/26/16    needle LOC,excisional biopsy    TUBAL LIGATION      TUNNELED VENOUS PORT PLACEMENT         Current Medications:     sodium chloride flush  5-40 mL IntraVENous 2 times per day    enoxaparin  40 mg SubCUTAneous Daily    vancomycin  1,000 mg IntraVENous Q12H    pantoprazole  40 mg Oral QAM AC       Allergies:  Dalvance [dalbavancin], Augmentin [amoxicillin-pot clavulanate], Cefazolin, and Adhesive tape    Social History:    TOBACCO:    None - past cig use  ETOH:    None   DRUGS:   None    MARITAL STATUS:      OCCUPATION:       Family History:   No immunodeficiency    REVIEW OF SYSTEMS:    No fever / chills / sweats. No weight loss. No visual change, eye pain, eye discharge. No oral lesion, sore throat, dysphagia. Denies cough / sputum. Denies chest pain, palpitations. Denies n / v / abd pain. No diarrhea. Denies dysuria or change in urinary function. Denies joint swelling or pain. No myalgia, arthralgia. Denies skin changes, itching  Denies focal weakness, sensory change or other neurologic symptom    Denies new / worse depression, psychiatric symptoms    PHYSICAL EXAM:      Vitals:    /72   Pulse 75   Temp 98.5 °F (36.9 °C) (Oral)   Resp 18   Ht 5' 6\" (1.676 m)   Wt 202 lb (91.6 kg)   SpO2 99%   BMI 32.60 kg/m²     GENERAL: No apparent distress.   RA  HEENT: Membranes moist, no oral lesion, PERRL  NECK:  Supple, no lymphadenopathy  LUNGS: Clear b/l, no rales, no dullness  CARDIAC: RRR, no murmur appreciated  ABD:  + BS, soft / NT  EXT:  L arm with diffuse swelling, red, warmth, no wound  NEURO: No focal neurologic findings  PSYCH: Orientation, sensorium, mood normal  LINES:  Peripheral iv    DATA:    Lab Results   Component Value Date    WBC 5.7 03/05/2022    HGB 11.2 (L) 03/05/2022    HCT 32.2 (L) 03/05/2022    MCV 91.1 03/05/2022     03/05/2022     Lab Results   Component Value Date    CREATININE 0.8 03/05/2022    BUN 12 03/05/2022     03/05/2022    K 4.4 03/05/2022     03/05/2022    CO2 21 03/05/2022       Hepatic Function Panel:   Lab Results   Component Value Date    ALKPHOS 104 03/05/2022    ALT 21 03/05/2022    AST 27 03/05/2022    PROT 6.5 03/05/2022    PROT 6.7 08/14/2017    BILITOT 0.4 03/05/2022    LABALBU 4.0 03/05/2022       Micro:  3/4 BC sent     Imaging:   3/5 L arm doppler u/s      IMPRESSION:      Patient Active Problem List   Diagnosis    Cellulitis of arm, left    Carcinoma of upper-outer quadrant of left female breast ([address]    Malignant neoplasm of upper-outer quadrant of left female breast ([facility] [address]    Encounter for antineoplastic immunotherapy    DCIS (ductal carcinoma in situ) of breast    Left acoustic neuroma ([facility] [address]    Lymphedema of arm    Skin lesion of left upper extremity    Encounter for monitoring cardiotoxic drug therapy    Acute lymphadenitis of upper limb    Flat epithelial atypia (FEA) of breast    Breast deformity    Lymphadenitis    Cellulitis of left upper arm       Hx obesity (BMI 32),JOSEPH, HL  Hx L breast ca  Hx L UE lymphedema   Hx L UE cellulitis  All PCN - augmentin caused a rash    Presents with L UE redness, swelling and pain -  L arm cellulitis assoc with lymphedema, Streptococcal in etiology (Streptococcus colonize lymphatics)      RECOMMENDATIONS:    Start Ancef  D/c Vancomycin  Compression - applied kerlix / ACE, use compression sleeve at home    Home on po Cephalexin 1 gm tid x 10 days  Then Cephalexin 500 mg bid (secondary prophylaxis)  F/u ID    Medical Decision Making: The following items were considered in medical decision making:  Discussion of patient care with other providers  Reviewed clinical lab tests  Reviewed radiology tests  Reviewed other diagnostic tests/interventions  Microbiology cultures and other micro tests reviewed      Risk of Complications/Morbidity: High   Illness(es)/ Infection present that pose threat to bodily function. There is potential for severe exacerbation of infection/side effects of treatment.   Therapy requires intensive monitoring for antimicrobial agent toxicity    Discussed with pt  Evens Mercado MD

## 2022-03-05 NOTE — ED PROVIDER NOTES
had chills last night although is afebrile here. She has no nausea, vomiting or diarrhea. No chest pain or shortness of breath. Patient otherwise has no complaints. Nursing Notes were all reviewed and agreed with or any disagreements were addressed in the HPI. REVIEW OF SYSTEMS    (2-9 systems for level 4, 10 or more for level 5)     Review of Systems   Constitutional: Negative for activity change, appetite change, chills and fever. HENT: Negative for congestion and rhinorrhea. Respiratory: Negative for cough and shortness of breath. Cardiovascular: Negative for chest pain. Gastrointestinal: Negative for abdominal pain, diarrhea, nausea and vomiting. Genitourinary: Negative for difficulty urinating, dysuria and hematuria. Skin: Positive for color change. Neurological: Negative for weakness and numbness. Positives and Pertinent negatives as per HPI. Except as noted above in the ROS, all other systems were reviewed and negative.        PAST MEDICAL HISTORY     Past Medical History:   Diagnosis Date    Allergic     Anesthesia     blood pressure dropped with uterine surgery    Asthma     Cancer ([facility] [address] 2008, 2012    BREAST  chemo and radiation    Cellulitis 09/28/2016    left arm    GERD (gastroesophageal reflux disease)     Hyperlipidemia          SURGICAL HISTORY     Past Surgical History:   Procedure Laterality Date    BACK SURGERY      disc L4-L5 shaved    BREAST SURGERY      LUMP    CERVICAL POLYP REMOVAL      MASTECTOMY, PARTIAL Right 5/26/16    needle LOC,excisional biopsy    TUBAL LIGATION      TUNNELED VENOUS PORT PLACEMENT           CURRENTMEDICATIONS       Previous Medications    CLINDAMYCIN (CLEOCIN) 150 MG CAPSULE    Take 150 mg by mouth 3 times daily    LACTOBACILLUS (CULTURELLE) CAPSULE    Take 1 capsule by mouth daily    LORAZEPAM (ATIVAN) 1 MG TABLET    Take 1 mg by mouth every 6 hours as needed     OMEPRAZOLE (PRILOSEC PO)    Take by mouth    VENTOLIN  (90 BASE) MCG/ACT INHALER    Inhale 108 mcg into the lungs as needed         ALLERGIES     Dalvance [dalbavancin], Augmentin [amoxicillin-pot clavulanate], Cefazolin, and Adhesive tape    FAMILYHISTORY       Family History   Problem Relation Age of Onset    Heart Disease Mother     Stroke Mother     High Blood Pressure Mother     Cancer Father         brain          SOCIAL HISTORY       Social History     Tobacco Use    Smoking status: Former Smoker     Packs/day: 1.00     Years: 10.00     Pack years: 10.00     Quit date: 2004     Years since quittin.4    Smokeless tobacco: Never Used   Substance Use Topics    Alcohol use: No    Drug use: No       SCREENINGS    [place] Coma Scale  Eye Opening: Spontaneous  Best Verbal Response: Oriented  Best Motor Response: Obeys commands  [place] Coma Scale Score: 15        PHYSICAL EXAM    (up to 7 for level 4, 8 or more for level 5)     ED Triage Vitals [22]   BP Temp Temp Source Pulse Resp SpO2 Height Weight   125/71 98 °F (36.7 °C) Oral 77 16 100 % 5' 6\" (1.676 m) 200 lb (90.7 kg)       Physical Exam  Vitals and nursing note reviewed. Constitutional:       Appearance: She is well-developed. She is not diaphoretic. HENT:      Head: Normocephalic and atraumatic. Right Ear: External ear normal.      Left Ear: External ear normal.      Nose: Nose normal.   Eyes:      General:         Right eye: No discharge. Left eye: No discharge. Neck:      Trachea: No tracheal deviation. Cardiovascular:      Rate and Rhythm: Normal rate and regular rhythm. Heart sounds: No murmur heard. Pulmonary:      Effort: Pulmonary effort is normal. No respiratory distress. Breath sounds: Normal breath sounds. No wheezing or rales. Abdominal:      General: Bowel sounds are normal. There is no distension. Palpations: Abdomen is soft. Tenderness: There is no abdominal tenderness. There is no guarding.    Musculoskeletal: General: Normal range of motion. Cervical back: Normal range of motion and neck supple. Comments: There is erythema, warmth and edema noted to the left arm, extending from the fingers up to the shoulder. There is no crepitus. See image below. She is otherwise neurovascularly intact. Radial pulses 2+4 range of motion of all joints. Skin:     General: Skin is warm and dry. Neurological:      Mental Status: She is alert and oriented to person, place, and time. Psychiatric:         Behavior: Behavior normal.             DIAGNOSTIC RESULTS   LABS:    Labs Reviewed   CBC WITH AUTO DIFFERENTIAL - Abnormal; Notable for the following components:       Result Value    RBC 3.67 (*)     Hemoglobin 11.5 (*)     Hematocrit 33.5 (*)     All other components within normal limits   COMPREHENSIVE METABOLIC PANEL - Abnormal; Notable for the following components:    Glucose 143 (*)     All other components within normal limits   CULTURE, BLOOD 1   CULTURE, BLOOD 2   LACTATE, SEPSIS   LACTATE, SEPSIS       When ordered only abnormal lab results are displayed. All other labs were within normal range or not returned as of this dictation. EKG: When ordered, EKG's are interpreted by the Emergency Department Physician in the absence of a cardiologist.  Please see their note for interpretation of EKG.     RADIOLOGY:   Non-plain film images such as CT, Ultrasound and MRI are read by the radiologist. Plain radiographic images are visualized and preliminarily interpreted by the ED Provider with the below findings:        Interpretation per the Radiologist below, if available at the time of this note:    No orders to display     ECHO Complete 2D W Doppler W Color    Result Date: 2/28/2022  Transthoracic Echocardiography Report (TTE)  Demographics   Patient Name       Jonny Cooper   Date of Study      02/28/2022         Gender              Female   Patient Number     [id number]         Date of Birth       1970 Visit Number       [id number]          Age                 45 year(s)   Accession Number   [id number]          Room Number         OP   Corporate ID       [id number]           Sonographer         Dimitris Tyler T   Ordering Physician MD Emma Pizano NP           Physician  Procedure Type of Study   TTE procedure:ECHOCARDIOGRAM COMPLETE 2D W DOPPLER W COLOR. Procedure Date Date: 02/28/2022 Start: 08:57 AM Study Location: [facility] - Echo Lab Technical Quality: Adequate visualization Additional Indications:Encounter for antineoplastic chemotherapy, chemo clearance. Patient Status: Routine Height: 66 inches Weight: 190 pounds BSA: 1.96 m2 BMI: 30.67 kg/m2 HR: 61 bpm  Conclusions   Summary  -Low normal global systolic function with an ejection fraction estimated at  50-55% visually and 54% by 3D Heart Model.  -No regional wall motion abnormalities noted.  -Average global longitudinal strain is estimated at -19.5% (Normal). -There is trivial mitral, tricuspid, and aortic regurgitation.  -Normal diastolic function. Avg. E/e'=10.5   Signature   ------------------------------------------------------------------  Electronically signed by Elizabet Smith MD (Interpreting  physician) on 02/28/2022 at 01:46 PM  ------------------------------------------------------------------   Findings   Left Ventricle  Normal left ventricular cavity size and wall thickness. Low normal global systolic function with an ejection fraction estimated at  50-55% visually and 54% by 3D Heart Model. No regional wall motion abnormalities noted. Average global longitudinal strain is estimated at -19.5% (Normal). Normal diastolic function. Avg. E/e'=10.5   Mitral Valve  Mitral valve is structurally normal.  Trivial mitral regurgitation. Left Atrium  The left atrium is normal in size.    Aortic Valve  The aortic valve is structurally normal. Trivial aortic regurgitation. Aorta  The aortic root is normal in size. Right Ventricle  The right ventricle is normal in size and function. TAPSE is estimated at  2.61 cm. Tricuspid Valve  Tricuspid valve is structurally normal.  There is trivial tricuspid regurgitation with a RVSP estimation of 23 mmHg. Right Atrium  The right atrial size is normal.   Pulmonic Valve  The pulmonic valve is structurally normal.  No evidence of pulmonic valve stenosis. No evidence of pulmonic valve regurgitation. Pericardial Effusion  No pericardial effusion noted. Pleural Effusion  No pleural effusion. Miscellaneous  The inferior vena cava appears normal in size with normal respiratory  variation.   M-Mode/2D Measurements (cm)   LV Diastolic Dimension: 0.60 cm LV Systolic Dimension: 1.31 cm  LV Septum Diastolic: 5.79 cm  LV PW Diastolic: 9.69 cm        AO Root Dimension: 3.2 cm                                  LA Dimension: 3.5 cm                                  LA Area: 18.2 cm2  LVOT: 2 cm                      LA volume/Index: 55 ml /28 ml/m2  Doppler Measurements   AV Peak Velocity: 143 cm/s     MV Peak E-Wave: 90.4 cm/s  AV Peak Gradient: 8.18 mmHg    MV Peak A-Wave: 79.7 cm/s  AV Mean Gradient: 4 mmHg       MV E/A Ratio: 1.13  LVOT Peak Velocity: 112 cm/s  AV Area (Continuity):2.73 cm2   TR Velocity:224 cm/s  TR Gradient:20.07 mmHg  Estimated RAP:3 mmHg  Estimated RVSP: 23 mmHg  E' Septal Velocity: 7.83 cm/s  E' Lateral Velocity: 9.57 cm/s   Aortic Valve   Peak Velocity: 143 cm/s     Mean Velocity: 99.4 cm/s  Peak Gradient: 8.18 mmHg    Mean Gradient: 4 mmHg  Area (continuity): 2.73 cm2  AV VTI: 33.8 cm  Aorta   Aortic Root: 3.2 cm  Ascending Aorta: 3.7 cm  LVOT Diameter: 2 cm            PROCEDURES   Unless otherwise noted below, none     Procedures    CRITICAL CARE TIME       CONSULTS:  PHARMACY TO DOSE VANCOMYCIN      EMERGENCY DEPARTMENT COURSE and DIFFERENTIAL DIAGNOSIS/MDM:   Vitals:    Vitals:    03/04/22 1918   BP: 125/71   Pulse: 77   Resp: 16   Temp: 98 °F (36.7 °C)   TempSrc: Oral   SpO2: 100%   Weight: 200 lb (90.7 kg)   Height: 5' 6\" (1.676 m)       Patient was given the following medications:  Medications   doxycycline (VIBRAMYCIN) 100 mg in dextrose 5 % 100 mL IVPB (has no administration in time range)   vancomycin (VANCOCIN) 1,250 mg in dextrose 5 % 250 mL IVPB (has no administration in time range)           Briefly, this is a 76-year-old female who presents to the emergency department today for evaluation for left arm cellulitis. Patient states that she does have a history of breast cancer, lymphedema in the left arm, and she states that she at baseline has edema noted to the left arm although this has been worse. Initially started on 2/10/2022 with redness and warmth, was taking leftover clindamycin and saw her primary care physician on 2/24/2022 was given more clindamycin and overall symptoms are worsening. On examination she does have erythema, warmth and edema noted to over the left arm she has full range of motion of all joints, she is otherwise neurovascularly intact    CBC shows no evidence of leukocytosis, mild anemia. CMP is unremarkable. Lactic acid is normal.  Patient will be given vancomycin and doxycycline secondary to allergies, due to her cellulitis and failure of outpatient treatment I feel that she will need to be made for further care and evaluation, hospitalist has been paged for admission, patient is updated and agreeable with plan. Stable for admission. FINAL IMPRESSION      1. Left arm cellulitis    2. Failure of outpatient treatment          DISPOSITION/PLAN   DISPOSITION Decision To Admit 03/04/2022 08:53:39 PM      PATIENT REFERRED TO:  No follow-up provider specified.     DISCHARGE MEDICATIONS:  New Prescriptions    No medications on file       DISCONTINUED MEDICATIONS:  Discontinued Medications    No medications on file (Please note that portions of this note were completed with a voice recognition program.  Efforts were made to edit the dictations but occasionally words are mis-transcribed.)    Rei Gomez PA-C (electronically signed)           Rei Gomez PA-C  03/04/22 [id number]

## 2022-03-05 NOTE — CARE COORDINATION
Discharge Planning:     (CM) reviewed the patient's chart to assess needs. Patient's Readmission Risk Score is 5%. Patient's medical insurance is [place]. Patient's PCP is Dr. Brien Bray. No needs anticipated, at this time. CM team to follow. Staff to inform CM if additional discharge needs arise.       JULIANNE Alejandre, [facility] -   [phone]    Electronically signed by CROW Fisher on 3/5/2022 at 10:22 AM

## 2022-03-05 NOTE — H&P
Hospital Medicine History and Physical    3/4/2022    Date of Admission: 3/4/2022    Date of Service: Pt seen/examined on 3/4/2022 and admitted to inpatient. Assessment/plan:  1. Cellulitis of left upper extremity. Cultures have been sent from the emergency room. Patient received a dose of doxycycline and vancomycin in the emergency room. Will continue on vancomycin. Check D-dimer; if elevated, will obtain venous ultrasound of left upper extremity. Keep left arm/forearm elevated. 2. Other comorbidities: history of asthma, history of breast cancer status post chemotherapy and radiation, left upper extremity lymphedema, GERD, hyperlipidemia, obesity with BMI of 32 kg/m². Activities: Up with assist  Prophylaxis: Subcutaneous Lovenox  Code status: Full code    ==========================================================  Chief complaint:  Chief Complaint   Patient presents with    Cellulitis     Pt reports cellulitus to L arm. states she has been on oral abx since Feb. 10 with no improvement. Pt reports lymphedema from breast cancer. History of Presenting Illness: This is a pleasant 45 y.o. female with history of asthma, history of breast cancer status post chemotherapy and radiation, left upper extremity lymphedema, GERD, hyperlipidemia, obesity with BMI of 32 kg/m², who presents to the emergency room with complaints of increasing erythema of left arm, ongoing for several weeks now. She was recently on clindamycin (she took a leftover) about a month ago with improvement/resolution of the cellulitis. However, cellulitis has since recurred since end of last month. She does not have fever or chills. She received a dose of doxycycline and vancomycin in the emergency room. She is being admitted for further management.     Past Medical History:      Diagnosis Date    Allergic     Anesthesia     blood pressure dropped with uterine surgery    Asthma     Cancer ([facility] [address] 2008, 2012    BREAST chemo and radiation    Cellulitis 09/28/2016    left arm    GERD (gastroesophageal reflux disease)     Hyperlipidemia        Past Surgical History:      Procedure Laterality Date    BACK SURGERY      disc L4-L5 shaved    BREAST SURGERY      LUMP    CERVICAL POLYP REMOVAL      MASTECTOMY, PARTIAL Right 5/26/16    needle LOC,excisional biopsy    TUBAL LIGATION      TUNNELED VENOUS PORT PLACEMENT         Medications (prior to admission):  Prior to Admission medications    Medication Sig Start Date End Date Taking? Authorizing Provider   Omeprazole (PRILOSEC PO) Take by mouth   Yes Historical Provider, MD   clindamycin (CLEOCIN) 150 MG capsule Take 150 mg by mouth 3 times daily    Historical Provider, MD   VENTOLIN  (90 BASE) MCG/ACT inhaler Inhale 108 mcg into the lungs as needed 5/27/16   Historical Provider, MD   LORazepam (ATIVAN) 1 MG tablet Take 1 mg by mouth every 6 hours as needed  5/27/16   Historical Provider, MD   lactobacillus (CULTURELLE) capsule Take 1 capsule by mouth daily 2/12/16   Melinda Thurman MD       Allergy(ies):  Dalvance [dalbavancin], Augmentin [amoxicillin-pot clavulanate], Cefazolin, and Adhesive tape    Social History:  TOBACCO:  reports that she quit smoking about 17 years ago. She has a 10.00 pack-year smoking history. She has never used smokeless tobacco.  ETOH:  reports no history of alcohol use. Family History:      Problem Relation Age of Onset    Heart Disease Mother     Stroke Mother     High Blood Pressure Mother     Cancer Father         brain       Review of Systems:  Pertinent positives are listed in HPI. At least 10-point ROS reviewed and were negative. Vitals and physical examination:  /71   Pulse 77   Temp 98 °F (36.7 °C) (Oral)   Resp 16   Ht 5' 6\" (1.676 m)   Wt 200 lb (90.7 kg)   SpO2 100%   BMI 32.28 kg/m²   Gen/overall appearance: Not in acute distress. Alert. Oriented x3.   Head: Normocephalic, atraumatic  Eyes: EOMI, good acuity  ENT: Oral mucosa moist  Neck: No JVD, thyromegaly  CVS: Nml S1S2, no MRG, RRR  Pulm: Clear bilaterally. No crackles/wheezes  Gastrointestinal: Soft, NT/ND, +BS  Musculoskeletal: Left upper extremity lymphedema with associated erythema. Neuro: No focal deficit. Moves extremity spontaneously. Psychiatry: Appropriate affect. Not agitated. Skin: Warm, dry with normal turgor. No rash  Capillary refill: Brisk,< 3 seconds   Peripheral Pulses: +2 palpable, equal bilaterally       Labs/imaging/EKG:  CBC:   Recent Labs     03/04/22 1947   WBC 7.5   HGB 11.5*        BMP:    Recent Labs     03/04/22 1947      K 3.5      CO2 23   BUN 11   CREATININE 0.9   GLUCOSE 143*     Hepatic:   Recent Labs     03/04/22 1947   AST 29   ALT 23   BILITOT 0.6   ALKPHOS 111       Discussed with ER ANJANA.       Thank you Ld Rand MD for the opportunity to be involved in this patient's care.    -----------------------------  Pablo Bradford MD  [facility] hospitalist

## 2022-03-05 NOTE — CONSULTS
[facility]   Progress Note      SUBJECTIVE:      54-year-old lady who has history of HER-2/mercedes positive, hormone receptor negative recurrent breast cancer. Originally diagnosed in . Currently she is being monitored  She has completed HER-2/mercedes directed treatment for recurrent malignancy in May 2021. Currently she is not on active treatment for malignancy    The patient has been struggling with left upper extremity lymphedema and recurrent cellulitis. Now she is admitted in the hospital with redness swelling and pain of the left upper extremity. Dopplers were negative for DVT. Currently she is on vancomycin and doxycycline. No fever chills night sweats. No abdominal pain constipation. OBJECTIVE    Physical  VITALS:  /72   Pulse 75   Temp 98.5 °F (36.9 °C) (Oral)   Resp 18   Ht 5' 6\" (1.676 m)   Wt 202 lb (91.6 kg)   SpO2 99%   BMI 32.60 kg/m²   TEMPERATURE:  Current - Temp: 98.5 °F (36.9 °C); Max - Temp  Av.4 °F (36.9 °C)  Min: 98 °F (36.7 °C)  Max: 99.1 °F (37.3 °C)  BLOOD PRESSURE RANGE:  Systolic (16QPQ), HRH:759 , Min:102 , HMS:164   ; Diastolic (97NQP), KTE:09, Min:57, Max:77    24HR INTAKE/OUTPUT:  No intake or output data in the 24 hours ending 22 1211    Conscious alert oriented  HEENT: No pallor or scleral icterus. Oral mucosa: No mucositis. No thrush. Neck: Supple, no lymphadenopathy. No thyromegaly  Lungs: No rales, wheezes, respiratory efforts are normal.  CVS: S1-S2 normal.  No murmurs or gallops heard. Abdomen: Soft, bowel sounds are heard. No tenderness. Neuro: No focal deficits. No cranial nerve palsies. Alert and oriented. Extremities: Left upper extremity 2+ lymphedema noted. Erythema was noted.     Data  Labs:  General Labs:  CBC with Differential:    Lab Results   Component Value Date    WBC 5.7 2022    RBC 3.54 2022    RBC 3.94 2017    HGB 11.2 2022    HCT 32.2 2022     2022    MCV 91.1 03/05/2022    MCH 31.6 03/05/2022    MCHC 34.7 03/05/2022    RDW 12.9 03/05/2022    SEGSPCT 54.0 09/02/2012    BANDSPCT 4.0 09/02/2012    LYMPHOPCT 21.1 03/05/2022    LYMPHOPCT 39.3 08/14/2017    MONOPCT 10.8 03/05/2022    EOSPCT 3.7 05/04/2012    BASOPCT 0.6 03/05/2022    MONOSABS 0.6 03/05/2022    LYMPHSABS 1.2 03/05/2022    EOSABS 0.1 03/05/2022    BASOSABS 0.0 03/05/2022    DIFFTYPE Manual 09/02/2012     BMP:    Lab Results   Component Value Date     03/05/2022    K 4.4 03/05/2022    K 4.2 12/03/2018     03/05/2022    CO2 21 03/05/2022    BUN 12 03/05/2022    LABALBU 4.0 03/05/2022    CREATININE 0.8 03/05/2022    CALCIUM 9.4 03/05/2022    GFRAA >60 03/05/2022    GFRAA >60 08/31/2012    LABGLOM >60 03/05/2022    GLUCOSE 111 03/05/2022    GLUCOSE 87 08/14/2017     Hepatic Function Panel:    Lab Results   Component Value Date    ALKPHOS 104 03/05/2022    ALT 21 03/05/2022    AST 27 03/05/2022    PROT 6.5 03/05/2022    PROT 6.7 08/14/2017    BILITOT 0.4 03/05/2022    LABALBU 4.0 03/05/2022     LDH:  No results found for: LDH  PT/INR:  No results found for: PROTIME, INR  PTT:  No results found for: APTT, PTT[APTT    Current Medications  Current Facility-Administered Medications: albuterol sulfate  (90 Base) MCG/ACT inhaler 2 puff, 2 puff, Inhalation, Q6H PRN  sodium chloride flush 0.9 % injection 5-40 mL, 5-40 mL, IntraVENous, 2 times per day  sodium chloride flush 0.9 % injection 10 mL, 10 mL, IntraVENous, PRN  0.9 % sodium chloride infusion, 25 mL, IntraVENous, PRN  enoxaparin (LOVENOX) injection 40 mg, 40 mg, SubCUTAneous, Daily  ondansetron (ZOFRAN-ODT) disintegrating tablet 4 mg, 4 mg, Oral, Q8H PRN **OR** ondansetron (ZOFRAN) injection 4 mg, 4 mg, IntraVENous, Q6H PRN  polyethylene glycol (GLYCOLAX) packet 17 g, 17 g, Oral, Daily PRN  acetaminophen (TYLENOL) tablet 650 mg, 650 mg, Oral, Q6H PRN **OR** acetaminophen (TYLENOL) suppository 650 mg, 650 mg, Rectal, Q6H PRN  oxyCODONE (ROXICODONE) immediate release tablet 5 mg, 5 mg, Oral, Q4H PRN  vancomycin 1000 mg IVPB in 250 mL D5W addavial, 1,000 mg, IntraVENous, Q12H  pantoprazole (PROTONIX) tablet 40 mg, 40 mg, Oral, QAM AC    ASSESSMENT AND PLAN    25-year-old lady has    1. Left upper extremity lymphedema and cellulitis:    -This is a chronic recurrent issue  -Currently she is on vancomycin and doxycycline.  -Dopplers negative for DVT done on 3/5/2022.  -Await ID evaluation. 2.  History of recurrent HER-2/mercedes positive hormone receptor negative breast cancer:    -Originally diagnosed in 2008  -Currently not on treatment and being monitored  -Last treatment with [facility] was completed in May 2021. Okay to discharge on oral antibiotics from oncology perspective.     Kishor Mariano MD

## 2022-03-05 NOTE — PROGRESS NOTES
Clinical Pharmacy Note: Pharmacy to Dose Vancomcyin    Vancomycin Day: 2  Current Dosing Regimen: 1000 mg IV every 12 hours  Dosing Method: Bayesian Modeling    Random: 10    Recent Labs     03/04/22 1947 03/05/22  0330   BUN 11 12       Recent Labs     03/04/22 1947 03/05/22  0330   CREATININE 0.9 0.8       Recent Labs     03/04/22 1947 03/05/22  0330   WBC 7.5 5.7       No intake or output data in the 24 hours ending 03/05/22 0703      Ht Readings from Last 1 Encounters:   03/04/22 5' 6\" (1.676 m)        Wt Readings from Last 1 Encounters:   03/05/22 202 lb (91.6 kg)         Body mass index is 32.6 kg/m². Estimated Creatinine Clearance: 94 mL/min (based on SCr of 0.8 mg/dL). Assessment/Plan:  Vancomycin level is therapeutic. Level was drawn appropriately in respect to last dose given. Continue vancomycin regimen of 1000 mg IV every 12 hours. Bayesian Modeling predicts an AUC of 444 mg/L*hr and trough of 13.8 mg/L. No repeat levels have been ordered at this time. Changes in regimen will be determined based on culture results, renal function, and clinical response. Pharmacy will continue to monitor and adjust regimen as necessary.     Thank you for the consult,    Jacqlyn Lombard, PharmD, [address] [facility] Pharmacist  [id number]

## 2022-03-05 NOTE — ED PROVIDER NOTES
[facility] Emergency Department      Pt Name: Liam Fischer  MRN: [id number]  [place] 1970  Date of evaluation: 3/4/2022  Provider: Rajan Fang MD  I independently performed a history and physical on Liam Fischer. All diagnostic, treatment, and disposition decisions were made by myself in conjunction with the advanced practice provider. HPI: Liam Fischer presented with   Chief Complaint   Patient presents with    Cellulitis     Pt reports cellulitus to L arm. states she has been on oral abx since Feb. 10 with no improvement. Pt reports lymphedema from breast cancer. Liam Fischer has a past medical history of Allergic, Anesthesia, Asthma, Cancer ([facility] [address] (2008, 2012), Cellulitis (09/28/2016), GERD (gastroesophageal reflux disease), and Hyperlipidemia. She has a past surgical history that includes Breast surgery; Tubal ligation; Tunneled venous port placement; back surgery; Cervical polyp removal; and Mastectomy, partial (Right, 5/26/16). No current facility-administered medications on file prior to encounter.      Current Outpatient Medications on File Prior to Encounter   Medication Sig Dispense Refill    Omeprazole (PRILOSEC PO) Take by mouth      VENTOLIN  (90 BASE) MCG/ACT inhaler Inhale 108 mcg into the lungs as needed      LORazepam (ATIVAN) 1 MG tablet Take 1 mg by mouth every 6 hours as needed       lactobacillus (CULTURELLE) capsule Take 1 capsule by mouth daily 30 capsule 2     PHYSICAL EXAM  Vitals: /71   Pulse 77   Temp 98 °F (36.7 °C) (Oral)   Resp 16   Ht 5' 6\" (1.676 m)   Wt 200 lb (90.7 kg)   SpO2 100%   BMI 32.28 kg/m²   Constitutional:  45 y.o. female alert  HENT:  Atraumatic, oral mucosa moist  Neck:  No visible JVD, supple  Chest/Lungs:  Respiratory effort normal   Abdomen:  Non-distended  Back:  No gross deformity  Extremities:  Normal tone and perfusion, cellulitis with edema left arm see below        Medical Decision Making and Plan: Briefly, this is an 45 y. o.female who presented with left arm pain and swelling, hx of recurrent cellulitis. Not improving with oral Clindamycin. Will initiate IV Vancomycin, doxy. Plan is to admit for further care. For further details of [facility] encounter, please see documentation by advanced practice provider MAXX Bhandari. Labs Reviewed   CBC WITH AUTO DIFFERENTIAL - Abnormal; Notable for the following components:       Result Value    RBC 3.67 (*)     Hemoglobin 11.5 (*)     Hematocrit 33.5 (*)     All other components within normal limits   COMPREHENSIVE METABOLIC PANEL - Abnormal; Notable for the following components:    Glucose 143 (*)     All other components within normal limits   CULTURE, BLOOD 1   CULTURE, BLOOD 2   CULTURE, MRSA, SCREENING   LACTATE, SEPSIS   D-DIMER, QUANTITATIVE     Medications administered:  Medications   doxycycline (VIBRAMYCIN) 100 mg in dextrose 5 % 100 mL IVPB (has no administration in time range)   vancomycin (VANCOCIN) 1,250 mg in dextrose 5 % 250 mL IVPB (has no administration in time range)   potassium chloride (KLOR-CON M) extended release tablet 40 mEq (has no administration in time range)     FINAL IMPRESSION:    1. Left arm cellulitis    2.  Failure of outpatient treatment       DISPOSITION Admitted 03/04/2022 09:10:28 PM       Ariadna Gilmore MD  03/04/22 9770

## 2022-03-05 NOTE — PROGRESS NOTES
Hospitalist Progress Note      PCP: Zenaida Blake MD    Date of Admission: 3/4/2022    Chief Complaint: Cellulitis of left arm    Hospital Course: This is a pleasant 45 y.o. female with history of asthma, history of breast cancer status post chemotherapy and radiation, left upper extremity lymphedema, GERD, hyperlipidemia, obesity with BMI of 32 kg/m², who presents to the emergency room with complaints of increasing erythema of left arm, ongoing for several weeks now. She was recently on clindamycin (she took a leftover) about a month ago with improvement/resolution of the cellulitis. However, cellulitis has since recurred since end of last month. She does not have fever or chills. She received a dose of doxycycline and vancomycin in the emergency room. admitted for further management. Subjective: Patient laying in bed. Asking to go home today. States she absolutely has to leave tomorrow due to her new job she does not want to call off for on Monday. Explained to patient it all depends on the course of her recovery with the cellulitis. She verbalized understanding but still very persistent to go home tomorrow. Explained infectious disease will see her today. She requested oncology consult, as she follows with team here. Consult placed. Patient reports swelling to left arm is about 25% more than her baseline. Denies chest pain shortness breath palpitation abdominal pain nausea vomiting diarrhea dysuria headache lightheadedness or dizziness. Reviewed plan of care, denied further needs or questions. Reviewed plan with nurse. Assessment/Plan:    Cellulitis left upper extremity  -Chronic lymphedema, chronic cellulitis infections to left arm. Patient reports clindamycin normally clears it however did not do so this time  -Obtain left upper arm venous Doppler-negative for DVT  -Infectious disease consult-DC vancomycin. Started Ancef. Compression to left arm. Home on p.o.  Keflex 1gm 3 times daily x10 days. Then Keflex 500 mg twice daily for secondary prophylaxis. ID follow-up outpatient  -oncology consult  -Received vancomycin and doxycycline in ER      Other comorbidities: history of asthma, history of breast cancer status post chemotherapy and radiation, left upper extremity lymphedema, GERD, hyperlipidemia, obesity with BMI of 32 kg/m². -Continue home medications as ordered    Active Hospital Problems    Diagnosis     Cellulitis of left upper arm [L03.114]        Medications:  Reviewed    Infusion Medications    sodium chloride       Scheduled Medications    sodium chloride flush  5-40 mL IntraVENous 2 times per day    enoxaparin  40 mg SubCUTAneous Daily    pantoprazole  40 mg Oral QAM AC    ceFAZolin  2,000 mg IntraVENous Q8H     PRN Meds: albuterol sulfate HFA, sodium chloride flush, sodium chloride, ondansetron **OR** ondansetron, polyethylene glycol, acetaminophen **OR** acetaminophen, oxyCODONE    No intake or output data in the 24 hours ending 03/05/22 1540    Physical Exam Performed:    /72   Pulse 75   Temp 98.5 °F (36.9 °C) (Oral)   Resp 18   Ht 5' 6\" (1.676 m)   Wt 202 lb (91.6 kg)   SpO2 99%   BMI 32.60 kg/m²     General appearance: No apparent distress, appears stated age and cooperative. HEENT: Pupils equal, round, and reactive to light. Conjunctivae/corneas clear. Neck: Supple, with full range of motion. No jugular venous distention. Trachea midline. Respiratory:  Normal respiratory effort. Clear to auscultation, bilaterally without Rales/Wheezes/Rhonchi. Cardiovascular: Regular rate and rhythm with normal S1/S2 without murmurs, rubs or gallops. Abdomen: Soft, non-tender, non-distended with normal bowel sounds. Musculoskeletal: No clubbing, cyanosis. Left arm 3+ edema. Full range of motion without deformity. Left arm redness from hand to mid upper arm above the elbow  Skin: Skin color, texture, turgor normal.  No rashes or lesions.   Neurologic: Neurovascularly intact without any focal sensory/motor deficits. Cranial nerves: II-XII intact, grossly non-focal.  Psychiatric: Alert and oriented, thought content appropriate, normal insight  Capillary Refill: Brisk,< 3 seconds   Peripheral Pulses: +2 palpable, equal bilaterally       Labs:   Recent Labs     03/04/22 1947 03/05/22  0330   WBC 7.5 5.7   HGB 11.5* 11.2*   HCT 33.5* 32.2*    227     Recent Labs     03/04/22 1947 03/05/22  0330    139   K 3.5 4.4    108   CO2 23 21   BUN 11 12   CREATININE 0.9 0.8   CALCIUM 9.5 9.4   PHOS  --  3.0     Recent Labs     03/04/22 1947 03/05/22  0330   AST 29 27   ALT 23 21   BILITOT 0.6 0.4   ALKPHOS 111 104     No results for input(s): INR in the last 72 hours. No results for input(s): Chandni Anchors in the last 72 hours. Urinalysis:      Lab Results   Component Value Date    NITRU Negative 02/12/2016    WBCUA 11 02/12/2016    BACTERIA 1+ 02/12/2016    RBCUA 1 02/12/2016    BLOODU Negative 02/12/2016    SPECGRAV 1.015 02/12/2016    GLUCOSEU 100 02/12/2016       Radiology:  VL Extremity Venous Left                 DVT Prophylaxis: Lovenox  Diet: ADULT DIET; Regular  Code Status: Full Code    PT/OT Eval Status: No acute needs     Dispo - home once medically stable, likely tomorrow      CODY Ellison - CNP      NOTE:  This report was transcribed using voice recognition software. Every effort was made to ensure accuracy; however, inadvertent computerized transcription errors may be present.

## 2022-03-05 NOTE — PROGRESS NOTES
Clinical Pharmacy Note: Pharmacy to Dose Vancomycin    Chantal Young is a 45 y.o. female started on Vancomycin for cellulitis; consult received from Dr. Isaac Skaggs to manage therapy. Goal AUC: 400-600 mg/L*hr  Goal Trough Level: 10-20 mcg/mL    Assessment/Plan:  A 1250 mg loading dose was given on 3/04/2022 at 2115  Initiate vancomycin 1000 mg IV every 12 hours. Bayesian modeling predicts an AUC of 482 mg/L*hr and a trough of 15.5 mcg/mL at steady state concentration. A vancomycin random level has been ordered for 3/05/2022 at 0600  Changes in regimen will be determined based on culture results, renal function, and clinical response. Pharmacy will continue to monitor and adjust regimen as necessary. Allergies:  Dalvance [dalbavancin], Augmentin [amoxicillin-pot clavulanate], Cefazolin, and Adhesive tape     Recent Labs     03/04/22 1947   CREATININE 0.9       Recent Labs     03/04/22 1947   WBC 7.5       Ht Readings from Last 1 Encounters:   03/04/22 5' 6\" (1.676 m)        Wt Readings from Last 1 Encounters:   03/05/22 202 lb (91.6 kg)         Estimated Creatinine Clearance: 83 mL/min (based on SCr of 0.9 mg/dL).       Thank you for the consult,    Chloe Guevara, AnahyD, [facility]

## 2022-03-06 VITALS
DIASTOLIC BLOOD PRESSURE: 81 MMHG | RESPIRATION RATE: 16 BRPM | HEIGHT: 66 IN | SYSTOLIC BLOOD PRESSURE: 135 MMHG | WEIGHT: 202 LBS | HEART RATE: 75 BPM | TEMPERATURE: 97.5 F | OXYGEN SATURATION: 100 % | BODY MASS INDEX: 32.47 KG/M2

## 2022-03-06 LAB
A/G RATIO: 1.5 (ref 1.1–2.2)
ALBUMIN SERPL-MCNC: 4.1 G/DL (ref 3.4–5)
ALP BLD-CCNC: 112 U/L (ref 40–129)
ALT SERPL-CCNC: 21 U/L (ref 10–40)
ANION GAP SERPL CALCULATED.3IONS-SCNC: 14 MMOL/L (ref 3–16)
AST SERPL-CCNC: 29 U/L (ref 15–37)
BASOPHILS ABSOLUTE: 0 K/UL (ref 0–0.2)
BASOPHILS RELATIVE PERCENT: 0.8 %
BILIRUB SERPL-MCNC: <0.2 MG/DL (ref 0–1)
BUN BLDV-MCNC: 11 MG/DL (ref 7–20)
CALCIUM SERPL-MCNC: 10.2 MG/DL (ref 8.3–10.6)
CHLORIDE BLD-SCNC: 107 MMOL/L (ref 99–110)
CO2: 20 MMOL/L (ref 21–32)
CREAT SERPL-MCNC: 0.9 MG/DL (ref 0.6–1.1)
EOSINOPHILS ABSOLUTE: 0.2 K/UL (ref 0–0.6)
EOSINOPHILS RELATIVE PERCENT: 5.3 %
GFR AFRICAN AMERICAN: >60
GFR NON-AFRICAN AMERICAN: >60
GLUCOSE BLD-MCNC: 99 MG/DL (ref 70–99)
HCT VFR BLD CALC: 37 % (ref 36–48)
HEMOGLOBIN: 12.6 G/DL (ref 12–16)
LYMPHOCYTES ABSOLUTE: 1.3 K/UL (ref 1–5.1)
LYMPHOCYTES RELATIVE PERCENT: 29.4 %
MAGNESIUM: 2.1 MG/DL (ref 1.8–2.4)
MCH RBC QN AUTO: 31 PG (ref 26–34)
MCHC RBC AUTO-ENTMCNC: 34 G/DL (ref 31–36)
MCV RBC AUTO: 91.2 FL (ref 80–100)
MONOCYTES ABSOLUTE: 0.5 K/UL (ref 0–1.3)
MONOCYTES RELATIVE PERCENT: 12.1 %
NEUTROPHILS ABSOLUTE: 2.3 K/UL (ref 1.7–7.7)
NEUTROPHILS RELATIVE PERCENT: 52.4 %
PDW BLD-RTO: 12.8 % (ref 12.4–15.4)
PHOSPHORUS: 3.9 MG/DL (ref 2.5–4.9)
PLATELET # BLD: 285 K/UL (ref 135–450)
PMV BLD AUTO: 7.6 FL (ref 5–10.5)
POTASSIUM SERPL-SCNC: 4.5 MMOL/L (ref 3.5–5.1)
RBC # BLD: 4.06 M/UL (ref 4–5.2)
SODIUM BLD-SCNC: 141 MMOL/L (ref 136–145)
TOTAL PROTEIN: 6.9 G/DL (ref 6.4–8.2)
WBC # BLD: 4.3 K/UL (ref 4–11)

## 2022-03-06 PROCEDURE — 6360000002 HC RX W HCPCS: Performed by: INTERNAL MEDICINE

## 2022-03-06 PROCEDURE — 80053 COMPREHEN METABOLIC PANEL: CPT

## 2022-03-06 PROCEDURE — 83735 ASSAY OF MAGNESIUM: CPT

## 2022-03-06 PROCEDURE — 6370000000 HC RX 637 (ALT 250 FOR IP): Performed by: NURSE PRACTITIONER

## 2022-03-06 PROCEDURE — 36415 COLL VENOUS BLD VENIPUNCTURE: CPT

## 2022-03-06 PROCEDURE — 84100 ASSAY OF PHOSPHORUS: CPT

## 2022-03-06 PROCEDURE — 6370000000 HC RX 637 (ALT 250 FOR IP): Performed by: INTERNAL MEDICINE

## 2022-03-06 PROCEDURE — 85025 COMPLETE CBC W/AUTO DIFF WBC: CPT

## 2022-03-06 RX ORDER — CEPHALEXIN 500 MG/1
CAPSULE ORAL
Qty: 100 CAPSULE | Refills: 0 | Status: SHIPPED | OUTPATIENT
Start: 2022-03-06 | End: 2022-08-10

## 2022-03-06 RX ADMIN — Medication 1 CAPSULE: at 08:48

## 2022-03-06 RX ADMIN — ENOXAPARIN SODIUM 40 MG: 40 INJECTION SUBCUTANEOUS at 08:48

## 2022-03-06 RX ADMIN — CEFAZOLIN 2000 MG: 10 INJECTION, POWDER, FOR SOLUTION INTRAVENOUS at 05:42

## 2022-03-06 RX ADMIN — PANTOPRAZOLE SODIUM 40 MG: 40 TABLET, DELAYED RELEASE ORAL at 05:34

## 2022-03-06 ASSESSMENT — PAIN SCALES - GENERAL: PAINLEVEL_OUTOF10: 0

## 2022-03-06 NOTE — PROGRESS NOTES
Discharge instructions handed to and discussed with pt. New prescriptions x 1 discussed with pt. Pt verbalized understanding. All questions answered. Pt denies vaccination needs. Pt denies any further needs. Pt refusing wheelchair escort. Walked to front door for discharge by this RN.  Electronically signed by Donnell Cast RN on 3/6/2022 at 10:33 AM

## 2022-03-06 NOTE — DISCHARGE SUMMARY
Hospital Medicine Discharge Summary    Patient ID: Rony Pringle      Patient's PCP: Mino Tran MD    Admit Date: 3/4/2022     Discharge Date: 3/6/2022      Admitting Physician: Pratik Marie MD     Discharge Physician: CODY Quiroga - CNP     Discharge Diagnoses  Cellulitis left upper extremity  Other comorbidities: history of asthma, history of breast cancer status post chemotherapy and radiation, left upper extremity lymphedema, GERD, hyperlipidemia, obesity with BMI of 32 kg/m². Hospital Course: 69-year-old female with history of asthma, breast cancer status post chemo and radiation, left upper extremity lymphedema, GERD, hyperlipidemia, obesity presented to ER with complaints of increasing erythema to left arm for several weeks. She reports that she has chronic lymphedema to this left arm and intermittently developed cellulitis in her left arm. She was on clindamycin for about a month, and had little improvement of the cellulitis. She did not meet sepsis criteria on admission. She had been started on doxycycline and vancomycin in the ER. Infectious disease consulted and started Ancef. Swelling and redness to left arm significantly improved after 24 hours of Ancef. Discharged home on Keflex 1 g 3 times daily for 10 days and then 500 mg twice daily for secondary prophylaxis. Encouraged patient to use compression to left arm, she does have a compression sleeve for it. She is to follow-up with ID outpatient. Discussed with infectious disease discharge status, he was agreeable to discharge today. Oncology was consulted per patient's request, will follow up outpatient. Continue home medications for other comorbidities. Patient stated they felt well and wanted to go home. Patient denied chest pain, shortness of breath, palpitations, abdominal pain, nausea vomiting, diarrhea, dysuria, headache lightheadedness or dizziness. Appetite good. Voiding without difficulty.   Reviewed plan of care with patient, verbalized understanding and agreement. Denied further questions or needs. Physical Exam Performed:     /81   Pulse 75   Temp 97.5 °F (36.4 °C) (Oral)   Resp 16   Ht 5' 6\" (1.676 m)   Wt 202 lb (91.6 kg)   SpO2 100%   BMI 32.60 kg/m²       General appearance:  No apparent distress, appears stated age and cooperative. HEENT:  Normal cephalic, atraumatic without obvious deformity. Pupils equal, round, and reactive to light. Extra ocular muscles intact. Conjunctivae/corneas clear. Neck: Supple, with full range of motion. No jugular venous distention. Trachea midline. Respiratory:  Normal respiratory effort. Clear to auscultation, bilaterally without Rales/Wheezes/Rhonchi. Cardiovascular:  Regular rate and rhythm with normal S1/S2 without murmurs, rubs or gallops. Abdomen: Soft, non-tender, non-distended with normal bowel sounds. Musculoskeletal:  No clubbing, cyanosis. 2+ edema to left arm, improved full range of motion without deformity. Skin: Skin color, texture, turgor normal. Left arm erythema significantly improved, by at least 75%. Neurologic:  Neurovascularly intact without any focal sensory/motor deficits. Cranial nerves: II-XII intact, grossly non-focal.  Psychiatric:  Alert and oriented, thought content appropriate, normal insight  Capillary Refill: Brisk,< 3 seconds   Peripheral Pulses: +2 palpable, equal bilaterally       Labs:  For convenience and continuity at follow-up the following most recent labs are provided:      CBC:    Lab Results   Component Value Date    WBC 4.3 03/06/2022    HGB 12.6 03/06/2022    HCT 37.0 03/06/2022     03/06/2022       Renal:    Lab Results   Component Value Date     03/06/2022    K 4.5 03/06/2022    K 4.2 12/03/2018     03/06/2022    CO2 20 03/06/2022    BUN 11 03/06/2022    CREATININE 0.9 03/06/2022    CALCIUM 10.2 03/06/2022    PHOS 3.9 03/06/2022         Significant Diagnostic Studies    Radiology: VL Extremity Venous Left                Consults:     IP CONSULT TO INFECTIOUS DISEASES  IP CONSULT TO ONCOLOGY    Disposition: Home    Condition at Discharge: Stable    Discharge Instructions/Follow-up:      Apply compression to left arm  Follow up with ID to discuss prophylaxis antibiotics for recurrent cellulitis  Follow up with pcp in 1 week  Follow up with oncology as directed    Code Status:  Prior     Activity: activity as tolerated    Diet: regular diet      Discharge Medications:     Discharge Medication List as of 3/6/2022 10:21 AM           Details   cephALEXin (KEFLEX) 500 MG capsule Take 2 capsules every 8 hours x 10 days to treat cellulitis, then 1 capsule twice a day for prophylaxis, Disp-100 capsule, R-0Normal              Details   Omeprazole (PRILOSEC PO) Take by mouth      VENTOLIN  (90 BASE) MCG/ACT inhaler Inhale 108 mcg into the lungs as needed, TIFF      LORazepam (ATIVAN) 1 MG tablet Take 1 mg by mouth every 6 hours as needed       lactobacillus (CULTURELLE) capsule Take 1 capsule by mouth daily, Disp-30 capsule, R-2             Time Spent on discharge is more than 30 minutes in the examination, evaluation, counseling and review of medications and discharge plan. Signed: CODY Conte - CNP   3/6/2022    The patient was seen and examined on day of discharge and this discharge summary is in conjunction with any daily progress note from day of discharge. Thank you Filiberto Garner MD for the opportunity to be involved in this patient's care. If you have any questions or concerns please feel free to contact me at [phone]. NOTE:  This report was transcribed using voice recognition software. Every effort was made to ensure accuracy; however, inadvertent computerized transcription errors may be present.

## 2022-03-06 NOTE — CARE COORDINATION
Discharge Planning:     (CM) spoke with patient about discharging today. Patient denied any needs. Patient reported that she will be driving herself home as her car is in the hospital parking lot. Patient reported that she will be returning home where she lives with her . No needs anticipated for CM Team. Medical staff to inform CM if needs arise for patient.     JULIANNE Butler, [facility] -   [phone]    Electronically signed by CROW Glez on 3/6/2022 at 10:56 AM

## 2022-03-07 LAB — MRSA CULTURE ONLY: NORMAL

## 2022-03-08 LAB
BLOOD CULTURE, ROUTINE: NORMAL
CULTURE, BLOOD 2: NORMAL

## 2022-04-28 ENCOUNTER — TELEMEDICINE (OUTPATIENT)
Dept: INFECTIOUS DISEASES | Age: 52
End: 2022-04-28
Payer: COMMERCIAL

## 2022-04-28 DIAGNOSIS — Z90.12 HISTORY OF LEFT MASTECTOMY: ICD-10-CM

## 2022-04-28 DIAGNOSIS — Z51.81 ENCOUNTER FOR MONITORING CARDIOTOXIC DRUG THERAPY: ICD-10-CM

## 2022-04-28 DIAGNOSIS — I89.0 LYMPHEDEMA OF ARM: ICD-10-CM

## 2022-04-28 DIAGNOSIS — K21.9 GASTROESOPHAGEAL REFLUX DISEASE WITHOUT ESOPHAGITIS: ICD-10-CM

## 2022-04-28 DIAGNOSIS — Z98.890 HISTORY OF REDUCTION SURGERY OF RIGHT BREAST: ICD-10-CM

## 2022-04-28 DIAGNOSIS — L03.114 CELLULITIS OF LEFT UPPER ARM: Primary | ICD-10-CM

## 2022-04-28 DIAGNOSIS — Z79.899 ENCOUNTER FOR MONITORING CARDIOTOXIC DRUG THERAPY: ICD-10-CM

## 2022-04-28 DIAGNOSIS — I88.9 LYMPHADENITIS: ICD-10-CM

## 2022-04-28 PROCEDURE — 99214 OFFICE O/P EST MOD 30 MIN: CPT | Performed by: INTERNAL MEDICINE

## 2022-04-28 RX ORDER — CEPHALEXIN 500 MG/1
500 CAPSULE ORAL 2 TIMES DAILY
Qty: 180 CAPSULE | Refills: 1 | Status: SHIPPED | OUTPATIENT
Start: 2022-04-28 | End: 2022-08-10

## 2022-04-28 RX ORDER — OMEPRAZOLE 20 MG/1
40 CAPSULE, DELAYED RELEASE ORAL DAILY
COMMUNITY

## 2022-04-28 ASSESSMENT — ENCOUNTER SYMPTOMS
APNEA: 0
CHOKING: 0
RHINORRHEA: 0
EYE REDNESS: 0
FACIAL SWELLING: 0
COUGH: 0
CHEST TIGHTNESS: 0
SHORTNESS OF BREATH: 0
DIARRHEA: 0
PHOTOPHOBIA: 0
BLOOD IN STOOL: 0
COLOR CHANGE: 0
ABDOMINAL PAIN: 0
TROUBLE SWALLOWING: 0
STRIDOR: 0
EYE DISCHARGE: 0
NAUSEA: 0

## 2022-04-28 NOTE — PROGRESS NOTES
Infectious Diseases Oupatient Follow-up Note            Reason for Visit:              Hospital follow-up for left upper extremity cellulitis  Primary Care Physician:  Travis Moore MD  History Obtained From:   Patient , Medical Records     CHIEF COMPLAINT:    Chief Complaint   Patient presents with    Follow-up       INTERVAL HISTORY: Andressa Lindsey is a 45 y.o. pleasant female patient, who had a virtual visit with me today for follow-up. History was obtained from chart review and the patient. The patient had a virtual visit with me today for above mentioned complaints. The patient has history of left breast cancer and upper extremity lymphedema. She was hospitalized last month for left arm cellulitis. She was seen by Dr. Xiomara Cardoso and was treated with IV cefazolin. She was recommended discharge on oral Keflex 1 g 3 times daily for 10 days with Dr. Keisha Fernandes followed by secondary prophylaxis with cephalexin 500 mg twice daily. The patient had a video visit with me today. Past Medical History:   Past medical and surgical history was reviewed by me during this visit in detail. Past Medical History:   Diagnosis Date    Allergic     Anesthesia     blood pressure dropped with uterine surgery    Asthma     Cancer ([facility] [address] 2008, 2012    BREAST  chemo and radiation    Cellulitis 09/28/2016    left arm    GERD (gastroesophageal reflux disease)     Hyperlipidemia        Past Surgical History:    Past Surgical History:   Procedure Laterality Date    BACK SURGERY      disc L4-L5 shaved    BREAST SURGERY      LUMP    CERVICAL POLYP REMOVAL      MASTECTOMY, PARTIAL Right 5/26/16    needle LOC,excisional biopsy    TUBAL LIGATION      TUNNELED VENOUS PORT PLACEMENT         Current Medications:    All medications were reviewed by me during this visit  Current Outpatient Medications   Medication Sig Dispense Refill    omeprazole (PRILOSEC) 20 MG delayed release capsule Take 40 mg by mouth daily      cephALEXin (KEFLEX) 500 MG capsule Take 1 capsule by mouth in the morning and at bedtime 180 capsule 1    cephALEXin (KEFLEX) 500 MG capsule Take 2 capsules every 8 hours x 10 days to treat cellulitis, then 1 capsule twice a day for prophylaxis 100 capsule 0    VENTOLIN  (90 BASE) MCG/ACT inhaler Inhale 108 mcg into the lungs as needed      LORazepam (ATIVAN) 1 MG tablet Take 1 mg by mouth every 6 hours as needed       lactobacillus (CULTURELLE) capsule Take 1 capsule by mouth daily 30 capsule 2    Omeprazole (PRILOSEC PO) Take by mouth (Patient not taking: Reported on 4/28/2022)       No current facility-administered medications for this visit. Family history: All family history was reviewed by me today    Family History   Problem Relation Age of Onset    Heart Disease Mother     Stroke Mother     High Blood Pressure Mother     Cancer Father         brain       No family history of immunocompromising or autoimmune conditions. No h/o TB in in family or contacts    REVIEW OF SYSTEMS:      Review of Systems   Constitutional: Positive for fatigue. Negative for chills, diaphoresis and unexpected weight change. HENT: Negative for congestion, ear discharge, ear pain, facial swelling, hearing loss, rhinorrhea and trouble swallowing. Eyes: Negative for photophobia, discharge, redness and visual disturbance. Respiratory: Negative for apnea, cough, choking, chest tightness, shortness of breath and stridor. Cardiovascular: Negative for chest pain and palpitations. Gastrointestinal: Negative for abdominal pain, blood in stool, diarrhea and nausea. Endocrine: Negative for polydipsia, polyphagia and polyuria. Genitourinary: Negative for difficulty urinating, dysuria, frequency, hematuria, menstrual problem and vaginal discharge. Musculoskeletal: Negative for arthralgias, joint swelling, myalgias and neck stiffness. Skin: Negative for color change and rash. Allergic/Immunologic: Negative for immunocompromised state. Neurological: Negative for dizziness, seizures, speech difficulty, light-headedness and headaches. Hematological: Negative for adenopathy. Psychiatric/Behavioral: Negative for agitation, hallucinations and suicidal ideas. PHYSICAL EXAM:      There were no vitals filed for this visit. Physical Exam  Constitutional:       General: She is not in acute distress. Appearance: She is well-developed. She is not diaphoretic. Comments: Limited exam could be conducted due to inherent limitations of the video visit   HENT:      Head: Normocephalic. Nose: Nose normal.   Eyes:      General: No scleral icterus. Right eye: No discharge. Left eye: No discharge. Conjunctiva/sclera: Conjunctivae normal.   Pulmonary:      Effort: Pulmonary effort is normal.   Musculoskeletal:         General: No deformity. Cervical back: Normal range of motion. Skin:     Coloration: Skin is not jaundiced. Findings: No erythema or rash. Neurological:      Mental Status: She is alert and oriented to person, place, and time. Mental status is at baseline.    Psychiatric:         Mood and Affect: Mood normal.         Judgment: Judgment normal.              DATA:  All available lab data wasreviewed by me during this visit    CBC:  Lab Results   Component Value Date    WBC 4.3 03/06/2022    HGB 12.6 03/06/2022    HCT 37.0 03/06/2022    MCV 91.2 03/06/2022     03/06/2022    LABLYMP 1.9 08/14/2017    MID 0.6 08/14/2017    GRAN 2.4 08/14/2017    LYMPHOPCT 29.4 03/06/2022    MIDPERCENT 11.6 08/14/2017    GRANULOCYTES 49.1 08/14/2017    RBC 4.06 03/06/2022    MCH 31.0 03/06/2022    MCHC 34.0 03/06/2022    RDW 12.8 03/06/2022       Last BMP:  Lab Results   Component Value Date     03/06/2022    K 4.5 03/06/2022    K 4.2 12/03/2018     03/06/2022    CO2 20 03/06/2022    BUN 11 03/06/2022    CREATININE 0.9 03/06/2022 history for the selected administration types on file for this patient. SocialHistory:  All social and epidemiologic history was reviewed and updated be me in detail today. Social History     Socioeconomic History    Marital status:      Spouse name: Suri Cordero Number of children: 2    Years of education: Not on file    Highest education level: Not on file   Occupational History    Not on file   Tobacco Use    Smoking status: Former Smoker     Packs/day: 1.00     Years: 10.00     Pack years: 10.00     Quit date: 2004     Years since quittin.5    Smokeless tobacco: Never Used   Substance and Sexual Activity    Alcohol use: No    Drug use: No    Sexual activity: Yes     Partners: Male   Other Topics Concern    Not on file   Social History Narrative    Not on file     Social Determinants of Health     Financial Resource Strain:     Difficulty of Paying Living Expenses: Not on file   Food Insecurity:     Worried About Running Out of Food in the Last Year: Not on file    Cecil of Food in the Last Year: Not on file   Transportation Needs:     Lack of Transportation (Medical): Not on file    Lack of Transportation (Non-Medical):  Not on file   Physical Activity:     Days of Exercise per Week: Not on file    Minutes of Exercise per Session: Not on file   Stress:     Feeling of Stress : Not on file   Social Connections:     Frequency of Communication with Friends and Family: Not on file    Frequency of Social Gatherings with Friends and Family: Not on file    Attends [employer]ism Services: Not on file    Active Member of Clubs or Organizations: Not on file    Attends Club or Organization Meetings: Not on file    Marital Status: Not on file   Intimate Partner Violence:     Fear of Current or Ex-Partner: Not on file    Emotionally Abused: Not on file    Physically Abused: Not on file    Sexually Abused: Not on file   Housing Stability:     Unable to Pay for Housing in the Last Year: Not on file    Number of Places Lived in the Last Year: Not on file    Unstable Housing in the Last Year: Not on file       COVID VACCINATION AND LAB RESULT RECORDS:     Internal Administration   First Dose      Second Dose           Last COVID Lab SARS-CoV-2, AIYANA (no units)   Date Value   09/09/2020 INVALID (A)            IMPRESSION:    The patient is a 45 y.o. old female who  has a past medical history of Allergic, Anesthesia, Asthma, Cancer ([facility] [address] (2008, 2012), Cellulitis (09/28/2016), GERD (gastroesophageal reflux disease), and Hyperlipidemia. was seen today for following problems:    1. Cellulitis of left upper arm    2. Lymphadenitis    3. Lymphedema of arm    4. Encounter for monitoring cardiotoxic drug therapy    5. History of left mastectomy    6. History of reduction surgery of right breast    7. Gastroesophageal reflux disease without esophagitis        Discussion:      The patient is new to me and was previously seen by my colleague Dr. Deepika Dabry during her hospitalization last month for left arm cellulitis. Her last CBC and CMP was done on 3/6/2022. Her white cell count of 4300 with hemoglobin of 12.6 and platelet count of 065,678 at that time. Serum creatinine was 0.9. Liver functions were okay    The patient had left breast mastectomy surgery in 2008. She subsequently had right breast reduction surgery for cosmetic purposes in 2012. She had a venous procedure on her left arm in 2020 for recurrent left arm cellulitis. The patient tells me that that procedure helped with her monthly flareups of cellulitis but it has still happened around 4 times over the past year. She had another flareup of the left arm cellulitis couple of days ago. She increased the dose of her Keflex from 500 twice a day to 1000 mg twice a day and that has helped. The patient has NAAT she has around 14 pills of 500 mg Keflex with her left at this time. The patient is a non-smoker.   She is nondiabetic. RECOMMENDATIONS:      Diagnostic Workup:    · I would like her to have a CBC and CMP every 2 to 3 months for therapeutic drug monitoring. The patient tells me that she is already getting these labs at [facility]. I asked her to have the [facility] fax the lab results to me as well  · Continue to follow fever curve  at home      Antimicrobials:    · Given her history of recurrent cellulitis, I agree with strategy for long-term secondary antibiotic prophylaxis for cellulitis prevention  · From couple of days ago, I will recommend the patient to stay on oral Keflex 1000 mg twice daily for total of 10 days. After that she can decrease the dose to 500 mg twice a day  · I will order p.o. Keflex 500 mg every 12 hour for her for 3 months with 1 refill on that  · We will see if she get any cellulitis flareups again on above regimen. The goal is to decrease the frequency as well as severity of the cellulitis flareups  · Discussed the importance of antibiotic compliance with her  · If she develops colitis flareup again, she was advised to increase oral Keflex dose to 1000 mg every 12 hours again and if her cellulitis is not improving with that and increasing dose, he was advised. Follow my office about a year. Patient verbalizes understanding. · Try to keep left arm elevated with few pillows underneath at night while in bed history of left mastectomy  · Discussed with patient the strategies to stay safe from [facility] exposures including safe social distancing, frequent and proper hand hygiene when outside and using 3 layered mouth and nose coverings/facemasks when outside their home. Labs ordered today in EPIC:    No orders of the defined types were placed in this encounter.       Medications ordered today in EPIC:    Orders Placed This Encounter   Medications    cephALEXin (KEFLEX) 500 MG capsule     Sig: Take 1 capsule by mouth in the morning and at bedtime     Dispense:  180 capsule     Refill:  1 Patient education and counseling:    · The patient was educated in detail about the side-effects of various antibiotics and things to watch for like new rashes, lip swelling, severereaction, worsening diarrhea, break through fever etc.  · Patient was instructed to stop antibiotics and call our office if these problems were to occur, or go to nearest ER ifexperiencing severe symptoms. · Discussed patient's condition and what to expect. All of the patient's questions were addressed in a satisfactory manner and patient verbalizedunderstanding all instructions. Follow-up:    · Follow-up with me in ID clinic or through video visit in 4-6 months    Isabel Landers is a 45 y.o. female being evaluated by a Virtual Visit encounter to address concerns as mentioned above. A caregiver was present when appropriate. Due to this being a TeleHealth encounter (During [facility]-49 public health emergency), evaluation of the following organ systems was limited: Vitals/Constitutional/EENT/Resp/CV/GI//MS/Neuro/Skin/Heme-Lymph-Imm. Pursuant to the emergency declaration under the [address] [place] [address] authority and the [employer] Act, this Virtual Visit was conducted with patient's (and/or legal guardian's) consent, to reduce the patient's risk of exposure to COVID-19 and provide necessary medical care. The patient (and/or legal guardian) has also been advised to contact this office for worsening conditions or problems, and seek emergency medical treatment and/or call 911 if deemed necessary. Services were provided through an audio and/or video synchronous discussion virtually to substitute for in-person clinic visit. Patient and provider were located at their individual homes. --Maryjane Pittman MD on 4/28/2022 at 11:35 AM    An electronic signature was used to authenticate this note.          Please note that this chart was generated using Dragon dictation software. Although every effort was made to ensure the accuracy of this automated transcription, some errors in transcription may have occurred inadvertently. If you may need any clarification, please do not hesitate to contact me through EPIC or at the phone number provided below with my electronic signature. Any pictures or media included in this note were obtained after taking informed verbal consent from the patient and with their approval to include those in the patient's medical record. Thankyou for involving me inthe care of your patient. If you have any additional questions, please do not hesitate to contact me.       Skip West MD, MPH, FACP, FIDSA  4/28/2022, 11:35 AM  [facility]   [address]  Office: [phone]  Fax: [phone]  Clinic days:  Tuesday & Thursday

## 2022-08-10 ENCOUNTER — TELEMEDICINE (OUTPATIENT)
Dept: INFECTIOUS DISEASES | Age: 52
End: 2022-08-10
Payer: COMMERCIAL

## 2022-08-10 DIAGNOSIS — I89.0 LYMPHEDEMA OF ARM: ICD-10-CM

## 2022-08-10 DIAGNOSIS — I88.9 LYMPHADENITIS: ICD-10-CM

## 2022-08-10 DIAGNOSIS — L03.114 CELLULITIS OF LEFT UPPER ARM: Primary | ICD-10-CM

## 2022-08-10 DIAGNOSIS — E66.09 CLASS 1 OBESITY DUE TO EXCESS CALORIES WITH BODY MASS INDEX (BMI) OF 32.0 TO 32.9 IN ADULT, UNSPECIFIED WHETHER SERIOUS COMORBIDITY PRESENT: ICD-10-CM

## 2022-08-10 DIAGNOSIS — D05.12 DUCTAL CARCINOMA IN SITU (DCIS) OF LEFT BREAST: ICD-10-CM

## 2022-08-10 DIAGNOSIS — Z98.890 HISTORY OF REDUCTION SURGERY OF RIGHT BREAST: ICD-10-CM

## 2022-08-10 DIAGNOSIS — Z51.81 THERAPEUTIC DRUG MONITORING: ICD-10-CM

## 2022-08-10 DIAGNOSIS — Z79.899 ENCOUNTER FOR MONITORING CARDIOTOXIC DRUG THERAPY: ICD-10-CM

## 2022-08-10 DIAGNOSIS — Z51.81 ENCOUNTER FOR MONITORING CARDIOTOXIC DRUG THERAPY: ICD-10-CM

## 2022-08-10 DIAGNOSIS — Z71.89 ENCOUNTER FOR MEDICATION COUNSELING: ICD-10-CM

## 2022-08-10 DIAGNOSIS — Z90.12 HISTORY OF LEFT MASTECTOMY: ICD-10-CM

## 2022-08-10 DIAGNOSIS — K21.9 GASTROESOPHAGEAL REFLUX DISEASE WITHOUT ESOPHAGITIS: ICD-10-CM

## 2022-08-10 PROCEDURE — 99213 OFFICE O/P EST LOW 20 MIN: CPT | Performed by: INTERNAL MEDICINE

## 2022-08-10 RX ORDER — CEPHALEXIN 500 MG/1
500 CAPSULE ORAL 2 TIMES DAILY
Qty: 180 CAPSULE | Refills: 1 | Status: SHIPPED | OUTPATIENT
Start: 2022-08-10 | End: 2023-02-06

## 2022-08-10 ASSESSMENT — ENCOUNTER SYMPTOMS
SORE THROAT: 0
EYE DISCHARGE: 0
WHEEZING: 0
SINUS PRESSURE: 0
BACK PAIN: 0
ABDOMINAL PAIN: 0
EYE REDNESS: 0
DIARRHEA: 0
NAUSEA: 0
RHINORRHEA: 0
COUGH: 0
CONSTIPATION: 0
SHORTNESS OF BREATH: 0
SINUS PAIN: 0

## 2022-08-10 NOTE — PROGRESS NOTES
medications were reviewed by me during this visit  Current Outpatient Medications   Medication Sig Dispense Refill    cephALEXin (KEFLEX) 500 MG capsule Take 1 capsule by mouth in the morning and at bedtime 180 capsule 1    omeprazole (PRILOSEC) 20 MG delayed release capsule Take 40 mg by mouth daily      VENTOLIN  (90 BASE) MCG/ACT inhaler Inhale 108 mcg into the lungs as needed      LORazepam (ATIVAN) 1 MG tablet Take 1 mg by mouth every 6 hours as needed        No current facility-administered medications for this visit. Family history: All family history was reviewed by me today    Family History   Problem Relation Age of Onset    Heart Disease Mother     Stroke Mother     High Blood Pressure Mother     Cancer Father         brain       No family history of immunocompromising or autoimmune conditions. No h/o TB in in family or contacts    REVIEW OF SYSTEMS:      Review of Systems   Constitutional:  Negative for chills, diaphoresis and fever. HENT:  Negative for ear discharge, ear pain, postnasal drip, rhinorrhea, sinus pressure, sinus pain and sore throat. Eyes:  Negative for discharge and redness. Respiratory:  Negative for cough, shortness of breath and wheezing. Cardiovascular:  Negative for chest pain and leg swelling. Gastrointestinal:  Negative for abdominal pain, constipation, diarrhea and nausea. Endocrine: Negative for cold intolerance, heat intolerance and polydipsia. Genitourinary:  Negative for dysuria, flank pain, frequency, hematuria and urgency. Musculoskeletal:  Negative for back pain and myalgias. Skin:  Negative for rash. Chronic left upper extremity lymphedema   Allergic/Immunologic: Negative for immunocompromised state. Neurological:  Negative for dizziness, seizures and headaches. Hematological:  Does not bruise/bleed easily. Psychiatric/Behavioral:  Negative for agitation, hallucinations and suicidal ideas. The patient is not nervous/anxious. All other systems reviewed and are negative. PHYSICAL EXAM:      There were no vitals filed for this visit. Physical Exam  Constitutional:       Appearance: Normal appearance. She is obese. HENT:      Head: Normocephalic and atraumatic. Right Ear: External ear normal.      Left Ear: External ear normal.      Nose: No rhinorrhea. Eyes:      General: No scleral icterus. Extraocular Movements: Extraocular movements intact. Conjunctiva/sclera: Conjunctivae normal.   Pulmonary:      Effort: Pulmonary effort is normal. No respiratory distress. Musculoskeletal:      Cervical back: Normal range of motion. Right lower leg: No edema. Left lower leg: No edema. Skin:     Coloration: Skin is not jaundiced or pale. Findings: No erythema or rash. Comments: No active cellulitis, chronic left upper extremity lymphedema, pressure stocking in place   Neurological:      Mental Status: She is alert and oriented to person, place, and time. Mental status is at baseline. Gait: Gait normal.   Psychiatric:         Mood and Affect: Mood normal.         Behavior: Behavior normal.         Thought Content:  Thought content normal.            DATA:  All available lab data wasreviewed by me during this visit    CBC:  Lab Results   Component Value Date    WBC 4.3 03/06/2022    HGB 12.6 03/06/2022    HCT 37.0 03/06/2022    MCV 91.2 03/06/2022     03/06/2022    LABLYMP 1.9 08/14/2017    MID 0.6 08/14/2017    GRAN 2.4 08/14/2017    LYMPHOPCT 29.4 03/06/2022    MIDPERCENT 11.6 08/14/2017    GRANULOCYTES 49.1 08/14/2017    RBC 4.06 03/06/2022    MCH 31.0 03/06/2022    MCHC 34.0 03/06/2022    RDW 12.8 03/06/2022       Last BMP:  Lab Results   Component Value Date/Time     03/06/2022 07:11 AM    K 4.5 03/06/2022 07:11 AM    K 4.2 12/03/2018 09:07 AM     03/06/2022 07:11 AM    CO2 20 03/06/2022 07:11 AM    BUN 11 03/06/2022 07:11 AM    CREATININE 0.9 03/06/2022 07:11 AM    GLUCOSE 99 03/06/2022 07:11 AM    GLUCOSE 87 08/14/2017 01:47 PM    CALCIUM 10.2 03/06/2022 07:11 AM        Hepatic Function Panel:  Lab Results   Component Value Date/Time    ALKPHOS 112 03/06/2022 07:11 AM    ALT 21 03/06/2022 07:11 AM    AST 29 03/06/2022 07:11 AM    PROT 6.9 03/06/2022 07:11 AM    PROT 6.7 08/14/2017 01:47 PM    BILITOT <0.2 03/06/2022 07:11 AM    LABALBU 4.1 03/06/2022 07:11 AM       Last CK: No results found for: CKTOTAL    Last ESR:  No results found for: SEDRATE    Last CRP:  No results found for: CRP      Imaging: All pertinent images and reports for the current visit were reviewed by me during this visit. I reviewed the chest x-ray/CT scan/MRI images and independently interpreted the findings and results today. Outside records:    Labs, Microbiology,Radiology and pertinent results from Care everywhere, if available, were reviewed as a part of the consultation. Problem list:       Patient Active Problem List   Diagnosis Code    Cellulitis of arm, left L03.114    Carcinoma of upper-outer quadrant of left female breast ([facility] [address] C50.412    Malignant neoplasm of upper-outer quadrant of left female breast ([facility] [address] C50.412    Encounter for antineoplastic immunotherapy Z51.12    DCIS (ductal carcinoma in situ) of breast D05.10    Left acoustic neuroma (HCC) D33.3    Lymphedema of arm I89.0    Skin lesion of left upper extremity L98.9    Encounter for monitoring cardiotoxic drug therapy Z51.81, Z79.899    Acute lymphadenitis of upper limb L04.2    Flat epithelial atypia (FEA) of breast N60.89    Breast deformity N64.89    Lymphadenitis I88.9    Cellulitis of left upper arm L03. 114       Microbiology:       All available micro data was reviewed by me during this visit        Allergies and immunizations:       Known drug Allergies: All allergies data was reviewed by me during this visit  Dalvance [dalbavancin], Augmentin [amoxicillin-pot clavulanate], Cefazolin, and Adhesive tape    Immunizations:   All immunizations were reviewed byme in detail. There is no immunization history for the selected administration types on file for this patient. SocialHistory:  All social and epidemiologic history was reviewed and updated be me in detail today. Social History     Socioeconomic History    Marital status:      Spouse name: Hafsa Lewis    Number of children: 2   Tobacco Use    Smoking status: Former     Packs/day: 1.00     Years: 10.00     Pack years: 10.00     Types: Cigarettes     Quit date: 2004     Years since quittin.8    Smokeless tobacco: Never   Substance and Sexual Activity    Alcohol use: No    Drug use: No    Sexual activity: Yes     Partners: Male       COVID VACCINATION AND LAB RESULT RECORDS:     Internal Administration   First Dose      Second Dose           Last COVID Lab SARS-CoV-2, AIYANA (no units)   Date Value   2020 INVALID (A)            IMPRESSION:    The patient is a 45 y.o. old female who  has a past medical history of Allergic, Anesthesia, Asthma, Cancer ([facility] [address] (, ), Cellulitis (2016), GERD (gastroesophageal reflux disease), and Hyperlipidemia. was seen today for following problems:    1. Cellulitis of left upper arm    2. Lymphadenitis    3. Lymphedema of arm    4. History of left mastectomy    5. History of reduction surgery of right breast    6. Gastroesophageal reflux disease without esophagitis    7. Encounter for monitoring cardiotoxic drug therapy    8. Ductal carcinoma in situ (DCIS) of left breast    9. Class 1 obesity due to excess calories with body mass index (BMI) of 32.0 to 32.9 in adult, unspecified whether serious comorbidity present    10. Encounter for medication counseling    11. Therapeutic drug monitoring        Discussion:      The patient is doing very well with oral Keflex and has not had any flareups of the cellulitis since she has been on secondary prophylaxis oral Keflex. She has just refilled oral Keflex for 3 months.   I would like her to stay on oral Keflex for at least 1 year for secondary prophylaxis. The patient is tolerating the medication well and is okay with that plan    The patient tells me that she had CBC and CMP done at [facility] lab in June. Unfortunately, I do not have any access to [facility] lab results. I have given the patient my fax number and she will ask [facility] to fax her lab results to us      RECOMMENDATIONS:      Diagnostic Workup: Will order CBC and CMP to be done in 3 months and then in 6 months. Standing orders for labs placed in the chart  Continue to follow fever curve  at home      Antimicrobials: Will order oral Keflex 500 mg 2 times daily for another 6 months  I am ordering 3 months worth of oral Keflex with 1 refill on that  Discussed the importance of antibiotic compliance with the patient  Continue to use the pressure stocking on the left upper extremity  If the patient continues to do well, she was advised to stop taking the Keflex when she has used all the refills ordered by me  No need for routine ID follow-up if she continues to do well. If however, she needs a follow-up in 6 months, she was advised to call my office and we will be glad to see her  The patient verbalizes understanding and is agreeable with above plan  Discussed with patient the strategies to stay safe from COVID 19 exposures including safe social distancing, frequent and proper hand hygiene when outside and using 3 layered mouth and nose coverings/facemasks when outside their home.       Labs ordered today in EPIC:    Orders Placed This Encounter   Procedures    CBC with Auto Differential     Standing Status:   Standing     Number of Occurrences:   2     Standing Expiration Date:   8/10/2023    Comprehensive Metabolic Panel     Standing Status:   Standing     Number of Occurrences:   2     Standing Expiration Date:   8/10/2023         Medications ordered today in EPIC:    Orders Placed This Encounter   Medications    cephALEXin (Romero Yara) 500 MG capsule     Sig: Take 1 capsule by mouth in the morning and at bedtime     Dispense:  180 capsule     Refill:  1         Drug Monitoring:    Monitor for antibiotic toxicity as follows: CBC, CMP every 3 months  Fax above results to [phone]. Patient education and counseling: The patient was educated in detail about the side-effects of various antibiotics and things to watch for like new rashes, lip swelling, severereaction, worsening diarrhea, break through fever etc.  Patient was instructed to stop antibiotics and call our office if these problems were to occur, or go to nearest ER ifexperiencing severe symptoms. Discussed patient's condition and what to expect. All of the patient's questions were addressed in a satisfactory manner and patient verbalizedunderstanding all instructions. Follow-up:    Follow-up with me in ID clinic or through video visit as needed in 6 months    Level of complexity of visit and medical decision making: High Veta Boeck is a 45 y.o. female being evaluated by a Virtual Visit encounter to address concerns as mentioned above. A caregiver was present when appropriate. Due to this being a TeleHealth encounter (During [facility]- public health emergency), evaluation of the following organ systems was limited: Vitals/Constitutional/EENT/Resp/CV/GI//MS/Neuro/Skin/Heme-Lymph-Imm. Pursuant to the emergency declaration under the [facility],  waiver authority and the [employer] Act, this Virtual Visit was conducted with patient's (and/or legal guardian's) consent, to reduce the patient's risk of exposure to COVID-19 and provide necessary medical care. The patient (and/or legal guardian) has also been advised to contact this office for worsening conditions or problems, and seek emergency medical treatment and/or call 911 if deemed necessary.      Services were provided through an audio and/or video synchronous discussion virtually to substitute for in-person clinic visit. Patient and provider were located at their individual homes. --Franck Akbar MD on 8/10/2022 at 10:37 AM         An electronic signature was used to authenticate this note. Please note that this chart was generated using Dragon dictation software. Although every effort was made to ensure the accuracy of this automated transcription, some errors in transcription may have occurred inadvertently. If you may need any clarification, please do not hesitate to contact me through EPIC or at the phone number provided below with my electronic signature. Any pictures or media included in this note were obtained after taking informed verbal consent from the patient and with their approval to include those in the patient's medical record. Thankyou for involving me inthe care of your patient. If you have any additional questions, please do not hesitate to contact me. Franck Akbar MD, MPH, [address] [place] [address]  8/10/2022, 10:37 AM  [facility] Infectious Disease   [address] [place] [address], [address] [place] [address]  Office: [phone]  Fax: [phone]  In-person Clinic days:  Tuesday & Thursday a.m. Virtual clinic days: Monday, Wednesday & Friday a.m.

## 2022-11-16 ENCOUNTER — TELEPHONE (OUTPATIENT)
Dept: INFECTIOUS DISEASES | Age: 52
End: 2022-11-16

## 2022-11-16 NOTE — TELEPHONE ENCOUNTER
[facility] left a VM stating patient needs a refill e-scribed to them for cephalexin 500mg,  90 day supply  Thank you

## 2022-11-17 NOTE — TELEPHONE ENCOUNTER
I had sent orders for Keflex to [facility] pharmacy during her last visit. Please reroute the [facility] electronically or  call them and give them a verbal order for the 90-day oral Keflex 500 mg twice daily with no refill. Thanks.

## 2024-07-24 ENCOUNTER — TELEMEDICINE (OUTPATIENT)
Age: 54
End: 2024-07-24
Payer: COMMERCIAL

## 2024-07-24 DIAGNOSIS — L03.114 CELLULITIS OF LEFT UPPER ARM: Primary | ICD-10-CM

## 2024-07-24 DIAGNOSIS — Z90.12 HISTORY OF LEFT MASTECTOMY: ICD-10-CM

## 2024-07-24 DIAGNOSIS — Z71.89 ENCOUNTER FOR MEDICATION COUNSELING: ICD-10-CM

## 2024-07-24 DIAGNOSIS — Z51.81 THERAPEUTIC DRUG MONITORING: ICD-10-CM

## 2024-07-24 DIAGNOSIS — Z98.890 HISTORY OF REDUCTION SURGERY OF RIGHT BREAST: ICD-10-CM

## 2024-07-24 DIAGNOSIS — K21.9 GASTROESOPHAGEAL REFLUX DISEASE WITHOUT ESOPHAGITIS: ICD-10-CM

## 2024-07-24 DIAGNOSIS — I89.0 LYMPHEDEMA OF ARM: ICD-10-CM

## 2024-07-24 PROCEDURE — 99214 OFFICE O/P EST MOD 30 MIN: CPT | Performed by: INTERNAL MEDICINE

## 2024-07-24 RX ORDER — CEPHALEXIN 500 MG/1
500 CAPSULE ORAL 2 TIMES DAILY
Qty: 180 CAPSULE | Refills: 1 | Status: SHIPPED | OUTPATIENT
Start: 2024-07-24 | End: 2025-01-20

## 2024-07-24 RX ORDER — ATORVASTATIN CALCIUM 80 MG/1
80 TABLET, FILM COATED ORAL DAILY
COMMUNITY

## 2024-07-24 RX ORDER — PANTOPRAZOLE SODIUM 40 MG/1
40 FOR SUSPENSION ORAL
COMMUNITY
End: 2024-07-24 | Stop reason: CLARIF

## 2024-07-24 RX ORDER — PANTOPRAZOLE SODIUM 40 MG/1
40 TABLET, DELAYED RELEASE ORAL DAILY
COMMUNITY

## 2024-07-24 ASSESSMENT — ENCOUNTER SYMPTOMS
SINUS PAIN: 0
EYE REDNESS: 0
SORE THROAT: 0
DIARRHEA: 0
COUGH: 0
WHEEZING: 0
SHORTNESS OF BREATH: 0
CONSTIPATION: 0
SINUS PRESSURE: 0
EYE DISCHARGE: 0
RHINORRHEA: 0
BACK PAIN: 0
NAUSEA: 0
ABDOMINAL PAIN: 0

## 2024-07-24 NOTE — PROGRESS NOTES
Infectious Diseases Oupatient Follow-up Note            Reason for Visit:               Follow-up for recurrent left upper extremity cellulitis  Primary Care Physician:  Michelle Callahan, CODY - CNP  History Obtained From:   Patient , Medical Records     CHIEF COMPLAINT:    Chief Complaint   Patient presents with    Follow-up     Cellulitis -nk       INTERVAL HISTORY: Lydia Bey is a 53 y.o. pleasant female patient, who had an outpatient visit with me today for follow-up.    History was obtained from chart review and the patient. The patient had a virtual clinic visit with me today for above mentioned complaints.    The patient has history of left-sided breast cancer and left upper extremity lymphedema for which she was hospitalized in March 2022.  She was treated with IV cefazolin at that time and was discharged on oral Keflex.    The patient was kept on oral Keflex 500 mg every 12 hours for secondary prophylaxis after that.  She was last seen by me on August 10, 2022 and I had ordered 6 months of Keflex refills for her.    She was advised to follow-up as needed.  The patient tells me that she had a couple of flareups last year and took Keflex 4 times daily during that time..    The patient was seen today for a follow-up.      Past Medical History:   Past medical and surgical history was reviewed by me during this visit in detail.    Past Medical History:   Diagnosis Date    Allergic     Anesthesia     blood pressure dropped with uterine surgery    Asthma     Cancer (HCC) 2008, 2012    BREAST  chemo and radiation    Cellulitis 09/28/2016    left arm    GERD (gastroesophageal reflux disease)     Hyperlipidemia        Past Surgical History:    Past Surgical History:   Procedure Laterality Date    BACK SURGERY      disc L4-L5 shaved    BREAST SURGERY      LUMP    CERVICAL POLYPECTOMY      MASTECTOMY, PARTIAL Right 5/26/16    needle LOC,excisional biopsy    TUBAL LIGATION      TUNNELED VENOUS PORT